# Patient Record
Sex: FEMALE | Race: WHITE | Employment: PART TIME | URBAN - NONMETROPOLITAN AREA
[De-identification: names, ages, dates, MRNs, and addresses within clinical notes are randomized per-mention and may not be internally consistent; named-entity substitution may affect disease eponyms.]

---

## 2017-02-28 PROBLEM — S42.017A CLOSED NONDISPLACED FRACTURE OF STERNAL END OF RIGHT CLAVICLE: Status: ACTIVE | Noted: 2017-02-28

## 2018-05-08 ENCOUNTER — OFFICE VISIT (OUTPATIENT)
Dept: ORTHOPEDIC SURGERY | Age: 38
End: 2018-05-08
Payer: COMMERCIAL

## 2018-05-08 VITALS
HEIGHT: 62 IN | HEART RATE: 102 BPM | BODY MASS INDEX: 36.8 KG/M2 | DIASTOLIC BLOOD PRESSURE: 83 MMHG | SYSTOLIC BLOOD PRESSURE: 117 MMHG | WEIGHT: 200 LBS

## 2018-05-08 DIAGNOSIS — S69.91XA HAND INJURIES, RIGHT, INITIAL ENCOUNTER: ICD-10-CM

## 2018-05-08 DIAGNOSIS — S62.339A CLOSED BOXER'S FRACTURE, INITIAL ENCOUNTER: Primary | ICD-10-CM

## 2018-05-08 PROCEDURE — 26605 TREAT METACARPAL FRACTURE: CPT | Performed by: ORTHOPAEDIC SURGERY

## 2018-05-08 PROCEDURE — 99214 OFFICE O/P EST MOD 30 MIN: CPT | Performed by: ORTHOPAEDIC SURGERY

## 2018-05-08 RX ORDER — IBUPROFEN 200 MG
200 TABLET ORAL EVERY 6 HOURS PRN
COMMUNITY
End: 2019-06-26

## 2018-05-08 RX ORDER — NAPROXEN 500 MG/1
TABLET ORAL
COMMUNITY
Start: 2018-05-03 | End: 2018-05-08 | Stop reason: ALTCHOICE

## 2018-05-08 RX ORDER — PREDNISONE 10 MG/1
TABLET ORAL
COMMUNITY
Start: 2018-03-08 | End: 2018-05-08 | Stop reason: ALTCHOICE

## 2018-05-08 RX ORDER — LISINOPRIL 10 MG/1
TABLET ORAL
Refills: 1 | COMMUNITY
Start: 2018-02-14 | End: 2019-11-07 | Stop reason: SDUPTHER

## 2018-06-08 ENCOUNTER — OFFICE VISIT (OUTPATIENT)
Dept: ORTHOPEDIC SURGERY | Age: 38
End: 2018-06-08

## 2018-06-08 VITALS
DIASTOLIC BLOOD PRESSURE: 80 MMHG | WEIGHT: 190 LBS | HEIGHT: 62 IN | HEART RATE: 85 BPM | BODY MASS INDEX: 34.96 KG/M2 | SYSTOLIC BLOOD PRESSURE: 129 MMHG

## 2018-06-08 DIAGNOSIS — S62.339A CLOSED BOXER'S FRACTURE, INITIAL ENCOUNTER: ICD-10-CM

## 2018-06-08 DIAGNOSIS — S69.91XA HAND INJURIES, RIGHT, INITIAL ENCOUNTER: Primary | ICD-10-CM

## 2018-06-08 PROCEDURE — 99024 POSTOP FOLLOW-UP VISIT: CPT | Performed by: ORTHOPAEDIC SURGERY

## 2019-05-14 ENCOUNTER — OFFICE VISIT (OUTPATIENT)
Dept: FAMILY MEDICINE CLINIC | Age: 39
End: 2019-05-14
Payer: COMMERCIAL

## 2019-05-14 VITALS
OXYGEN SATURATION: 99 % | BODY MASS INDEX: 39.14 KG/M2 | SYSTOLIC BLOOD PRESSURE: 126 MMHG | HEART RATE: 92 BPM | TEMPERATURE: 97.8 F | DIASTOLIC BLOOD PRESSURE: 78 MMHG | WEIGHT: 214 LBS

## 2019-05-14 DIAGNOSIS — L24.7 CONTACT DERMATITIS AND ECZEMA DUE TO PLANT: Primary | ICD-10-CM

## 2019-05-14 PROCEDURE — 99213 OFFICE O/P EST LOW 20 MIN: CPT | Performed by: FAMILY MEDICINE

## 2019-05-14 PROCEDURE — 96372 THER/PROPH/DIAG INJ SC/IM: CPT | Performed by: FAMILY MEDICINE

## 2019-05-14 RX ORDER — PREDNISONE 10 MG/1
10 TABLET ORAL 2 TIMES DAILY
Qty: 10 TABLET | Refills: 0 | Status: SHIPPED | OUTPATIENT
Start: 2019-05-14 | End: 2019-05-19

## 2019-05-14 RX ORDER — FLUTICASONE PROPIONATE 50 MCG
SPRAY, SUSPENSION (ML) NASAL
COMMUNITY
Start: 2019-01-29 | End: 2020-02-26

## 2019-05-14 RX ORDER — BUSPIRONE HYDROCHLORIDE 10 MG/1
TABLET ORAL
COMMUNITY
Start: 2018-02-14 | End: 2019-11-07 | Stop reason: SDUPTHER

## 2019-05-14 RX ORDER — METHYLPREDNISOLONE ACETATE 40 MG/ML
40 INJECTION, SUSPENSION INTRA-ARTICULAR; INTRALESIONAL; INTRAMUSCULAR; SOFT TISSUE ONCE
Status: COMPLETED | OUTPATIENT
Start: 2019-05-14 | End: 2019-05-14

## 2019-05-14 RX ADMIN — METHYLPREDNISOLONE ACETATE 40 MG: 40 INJECTION, SUSPENSION INTRA-ARTICULAR; INTRALESIONAL; INTRAMUSCULAR; SOFT TISSUE at 16:51

## 2019-05-14 NOTE — PROGRESS NOTES
(97.1 kg)   SpO2 99%   BMI 39.14 kg/m²     EXAM:   Physical Exam   Constitutional: She appears well-developed and well-nourished. HENT:   Head: Normocephalic and atraumatic. Eyes: Pupils are equal, round, and reactive to light. EOM are normal.   Neck: Normal range of motion. Cardiovascular: Normal rate and regular rhythm. Pulmonary/Chest: Effort normal and breath sounds normal.   Skin: Skin is warm and dry. Rash noted. There is erythema. Rash as described above   Nursing note and vitals reviewed. Fozia was seen today for rash. Diagnoses and all orders for this visit:    Contact dermatitis and eczema due to plant  -     methylPREDNISolone acetate (DEPO-MEDROL) injection 40 mg  -     predniSONE (DELTASONE) 10 MG tablet; Take 1 tablet by mouth 2 times daily for 5 days    If not feeling better in 2 to 3 days needs to follow up      Seen by:   Kiah Stanton DO

## 2019-05-17 ENCOUNTER — TELEPHONE (OUTPATIENT)
Dept: FAMILY MEDICINE CLINIC | Age: 39
End: 2019-05-17

## 2019-05-17 DIAGNOSIS — E55.9 VITAMIN D DEFICIENCY: ICD-10-CM

## 2019-05-17 DIAGNOSIS — R73.01 IMPAIRED FASTING BLOOD SUGAR: ICD-10-CM

## 2019-05-17 DIAGNOSIS — E78.2 MIXED HYPERLIPIDEMIA: Primary | ICD-10-CM

## 2019-05-17 DIAGNOSIS — E07.9 THYROID DISEASE: ICD-10-CM

## 2019-11-07 ENCOUNTER — HOSPITAL ENCOUNTER (OUTPATIENT)
Age: 39
Discharge: HOME | End: 2019-11-09
Payer: COMMERCIAL

## 2019-11-07 ENCOUNTER — OFFICE VISIT (OUTPATIENT)
Dept: FAMILY MEDICINE CLINIC | Age: 39
End: 2019-11-07
Payer: COMMERCIAL

## 2019-11-07 VITALS
HEIGHT: 63 IN | BODY MASS INDEX: 39.42 KG/M2 | OXYGEN SATURATION: 98 % | TEMPERATURE: 98.6 F | HEART RATE: 90 BPM | WEIGHT: 222.5 LBS | SYSTOLIC BLOOD PRESSURE: 128 MMHG | DIASTOLIC BLOOD PRESSURE: 68 MMHG

## 2019-11-07 DIAGNOSIS — F41.9 ANXIETY: ICD-10-CM

## 2019-11-07 DIAGNOSIS — E07.9 THYROID DISEASE: ICD-10-CM

## 2019-11-07 DIAGNOSIS — E55.9 VITAMIN D DEFICIENCY: ICD-10-CM

## 2019-11-07 DIAGNOSIS — Z00.00 ENCOUNTER FOR WELL ADULT EXAM WITHOUT ABNORMAL FINDINGS: Primary | ICD-10-CM

## 2019-11-07 DIAGNOSIS — I10 ESSENTIAL HYPERTENSION: ICD-10-CM

## 2019-11-07 DIAGNOSIS — E78.2 MIXED HYPERLIPIDEMIA: ICD-10-CM

## 2019-11-07 DIAGNOSIS — R73.01 IMPAIRED FASTING BLOOD SUGAR: ICD-10-CM

## 2019-11-07 PROBLEM — S42.017A CLOSED NONDISPLACED FRACTURE OF STERNAL END OF RIGHT CLAVICLE: Status: RESOLVED | Noted: 2017-02-28 | Resolved: 2019-11-07

## 2019-11-07 LAB
ALBUMIN SERPL-MCNC: 3.7 G/DL (ref 3.5–5.2)
ALP BLD-CCNC: 65 U/L (ref 35–104)
ALT SERPL-CCNC: 12 U/L (ref 0–32)
ANION GAP SERPL CALCULATED.3IONS-SCNC: 12 MMOL/L (ref 7–16)
AST SERPL-CCNC: 15 U/L (ref 0–31)
BASOPHILS ABSOLUTE: 0.06 E9/L (ref 0–0.2)
BASOPHILS RELATIVE PERCENT: 0.7 % (ref 0–2)
BILIRUB SERPL-MCNC: 0.3 MG/DL (ref 0–1.2)
BUN BLDV-MCNC: 11 MG/DL (ref 6–20)
CALCIUM SERPL-MCNC: 8.9 MG/DL (ref 8.6–10.2)
CHLORIDE BLD-SCNC: 102 MMOL/L (ref 98–107)
CHOLESTEROL, TOTAL: 172 MG/DL (ref 0–199)
CO2: 23 MMOL/L (ref 22–29)
CREAT SERPL-MCNC: 0.8 MG/DL (ref 0.5–1)
EOSINOPHILS ABSOLUTE: 0.23 E9/L (ref 0.05–0.5)
EOSINOPHILS RELATIVE PERCENT: 2.5 % (ref 0–6)
GFR AFRICAN AMERICAN: >60
GFR NON-AFRICAN AMERICAN: >60 ML/MIN/1.73
GLUCOSE BLD-MCNC: 92 MG/DL (ref 74–99)
HBA1C MFR BLD: 5.4 % (ref 4–5.6)
HCT VFR BLD CALC: 43.1 % (ref 34–48)
HDLC SERPL-MCNC: 60 MG/DL
HEMOGLOBIN: 13.5 G/DL (ref 11.5–15.5)
IMMATURE GRANULOCYTES #: 0.02 E9/L
IMMATURE GRANULOCYTES %: 0.2 % (ref 0–5)
LDL CHOLESTEROL CALCULATED: 81 MG/DL (ref 0–99)
LYMPHOCYTES ABSOLUTE: 3.77 E9/L (ref 1.5–4)
LYMPHOCYTES RELATIVE PERCENT: 41.3 % (ref 20–42)
MCH RBC QN AUTO: 29.7 PG (ref 26–35)
MCHC RBC AUTO-ENTMCNC: 31.3 % (ref 32–34.5)
MCV RBC AUTO: 94.7 FL (ref 80–99.9)
MONOCYTES ABSOLUTE: 0.53 E9/L (ref 0.1–0.95)
MONOCYTES RELATIVE PERCENT: 5.8 % (ref 2–12)
NEUTROPHILS ABSOLUTE: 4.51 E9/L (ref 1.8–7.3)
NEUTROPHILS RELATIVE PERCENT: 49.5 % (ref 43–80)
PDW BLD-RTO: 13 FL (ref 11.5–15)
PLATELET # BLD: 284 E9/L (ref 130–450)
PMV BLD AUTO: 9.8 FL (ref 7–12)
POTASSIUM SERPL-SCNC: 4.2 MMOL/L (ref 3.5–5)
RBC # BLD: 4.55 E12/L (ref 3.5–5.5)
SODIUM BLD-SCNC: 137 MMOL/L (ref 132–146)
TOTAL PROTEIN: 7.6 G/DL (ref 6.4–8.3)
TRIGL SERPL-MCNC: 153 MG/DL (ref 0–149)
TSH SERPL DL<=0.05 MIU/L-ACNC: 2.54 UIU/ML (ref 0.27–4.2)
VITAMIN D 25-HYDROXY: 35 NG/ML (ref 30–100)
VLDLC SERPL CALC-MCNC: 31 MG/DL
WBC # BLD: 9.1 E9/L (ref 4.5–11.5)

## 2019-11-07 PROCEDURE — 85025 COMPLETE CBC W/AUTO DIFF WBC: CPT

## 2019-11-07 PROCEDURE — 82306 VITAMIN D 25 HYDROXY: CPT

## 2019-11-07 PROCEDURE — 84443 ASSAY THYROID STIM HORMONE: CPT

## 2019-11-07 PROCEDURE — 99395 PREV VISIT EST AGE 18-39: CPT | Performed by: FAMILY MEDICINE

## 2019-11-07 PROCEDURE — 80053 COMPREHEN METABOLIC PANEL: CPT

## 2019-11-07 PROCEDURE — 36415 COLL VENOUS BLD VENIPUNCTURE: CPT

## 2019-11-07 PROCEDURE — 80061 LIPID PANEL: CPT

## 2019-11-07 PROCEDURE — 83036 HEMOGLOBIN GLYCOSYLATED A1C: CPT

## 2019-11-07 PROCEDURE — G8484 FLU IMMUNIZE NO ADMIN: HCPCS | Performed by: FAMILY MEDICINE

## 2019-11-07 RX ORDER — NORGESTIMATE AND ETHINYL ESTRADIOL 0.25-0.035
KIT ORAL
Qty: 84 TABLET | Refills: 1 | Status: SHIPPED | OUTPATIENT
Start: 2019-11-07 | End: 2020-02-03 | Stop reason: SDUPTHER

## 2019-11-07 RX ORDER — LISINOPRIL 10 MG/1
TABLET ORAL
Qty: 90 TABLET | Refills: 1 | Status: SHIPPED
Start: 2019-11-07 | End: 2020-02-26 | Stop reason: SINTOL

## 2019-11-07 RX ORDER — BUSPIRONE HYDROCHLORIDE 10 MG/1
10 TABLET ORAL 3 TIMES DAILY PRN
Qty: 90 TABLET | Refills: 3 | Status: SHIPPED
Start: 2019-11-07 | End: 2020-02-26

## 2019-11-07 ASSESSMENT — PATIENT HEALTH QUESTIONNAIRE - PHQ9
1. LITTLE INTEREST OR PLEASURE IN DOING THINGS: 1
2. FEELING DOWN, DEPRESSED OR HOPELESS: 1
SUM OF ALL RESPONSES TO PHQ QUESTIONS 1-9: 2
SUM OF ALL RESPONSES TO PHQ9 QUESTIONS 1 & 2: 2
SUM OF ALL RESPONSES TO PHQ QUESTIONS 1-9: 2

## 2019-11-07 ASSESSMENT — ENCOUNTER SYMPTOMS
BACK PAIN: 0
COUGH: 0
DIARRHEA: 0
VOMITING: 0
ABDOMINAL PAIN: 0
WHEEZING: 0
EYE PAIN: 0
CONSTIPATION: 0
SINUS PAIN: 0
NAUSEA: 0
SORE THROAT: 0
SHORTNESS OF BREATH: 0
CHEST TIGHTNESS: 0
TROUBLE SWALLOWING: 0

## 2019-11-24 ENCOUNTER — HOSPITAL ENCOUNTER (EMERGENCY)
Age: 39
Discharge: HOME | End: 2019-11-24
Payer: COMMERCIAL

## 2019-11-24 VITALS
RESPIRATION RATE: 16 BRPM | HEART RATE: 87 BPM | TEMPERATURE: 98 F | SYSTOLIC BLOOD PRESSURE: 128 MMHG | HEIGHT: 62 IN | BODY MASS INDEX: 39.56 KG/M2 | DIASTOLIC BLOOD PRESSURE: 89 MMHG | OXYGEN SATURATION: 97 % | WEIGHT: 215 LBS

## 2019-11-24 DIAGNOSIS — S05.02XA ABRASION OF LEFT CORNEA, INITIAL ENCOUNTER: Primary | ICD-10-CM

## 2019-11-24 PROCEDURE — 99282 EMERGENCY DEPT VISIT SF MDM: CPT

## 2019-11-24 PROCEDURE — 6370000000 HC RX 637 (ALT 250 FOR IP): Performed by: PHYSICIAN ASSISTANT

## 2019-11-24 RX ORDER — ERYTHROMYCIN 5 MG/G
OINTMENT OPHTHALMIC
Qty: 1 TUBE | Refills: 0 | Status: SHIPPED | OUTPATIENT
Start: 2019-11-24 | End: 2019-12-16

## 2019-11-24 RX ORDER — TETRACAINE HYDROCHLORIDE 5 MG/ML
2 SOLUTION OPHTHALMIC ONCE
Status: COMPLETED | OUTPATIENT
Start: 2019-11-24 | End: 2019-11-24

## 2019-11-24 RX ADMIN — TETRACAINE HYDROCHLORIDE 2 DROP: 5 SOLUTION OPHTHALMIC at 17:08

## 2019-11-24 RX ADMIN — FLUORESCEIN SODIUM 1 MG: 1 STRIP OPHTHALMIC at 17:08

## 2019-12-16 ENCOUNTER — OFFICE VISIT (OUTPATIENT)
Dept: FAMILY MEDICINE CLINIC | Age: 39
End: 2019-12-16
Payer: COMMERCIAL

## 2019-12-16 VITALS
HEART RATE: 96 BPM | OXYGEN SATURATION: 96 % | SYSTOLIC BLOOD PRESSURE: 108 MMHG | TEMPERATURE: 98.7 F | HEIGHT: 63 IN | BODY MASS INDEX: 39.87 KG/M2 | WEIGHT: 225 LBS | DIASTOLIC BLOOD PRESSURE: 62 MMHG

## 2019-12-16 DIAGNOSIS — J01.90 ACUTE NON-RECURRENT SINUSITIS, UNSPECIFIED LOCATION: Primary | ICD-10-CM

## 2019-12-16 PROCEDURE — G8484 FLU IMMUNIZE NO ADMIN: HCPCS | Performed by: FAMILY MEDICINE

## 2019-12-16 PROCEDURE — G8427 DOCREV CUR MEDS BY ELIG CLIN: HCPCS | Performed by: FAMILY MEDICINE

## 2019-12-16 PROCEDURE — G8417 CALC BMI ABV UP PARAM F/U: HCPCS | Performed by: FAMILY MEDICINE

## 2019-12-16 PROCEDURE — 99213 OFFICE O/P EST LOW 20 MIN: CPT | Performed by: FAMILY MEDICINE

## 2019-12-16 PROCEDURE — 1036F TOBACCO NON-USER: CPT | Performed by: FAMILY MEDICINE

## 2019-12-16 RX ORDER — BENZONATATE 200 MG/1
200 CAPSULE ORAL 3 TIMES DAILY PRN
Qty: 30 CAPSULE | Refills: 0 | Status: SHIPPED | OUTPATIENT
Start: 2019-12-16 | End: 2019-12-23

## 2019-12-16 RX ORDER — PREDNISONE 10 MG/1
10 TABLET ORAL 2 TIMES DAILY
Qty: 10 TABLET | Refills: 0 | Status: SHIPPED | OUTPATIENT
Start: 2019-12-16 | End: 2019-12-21

## 2019-12-16 RX ORDER — CEFDINIR 300 MG/1
300 CAPSULE ORAL 2 TIMES DAILY
Qty: 20 CAPSULE | Refills: 0 | Status: SHIPPED | OUTPATIENT
Start: 2019-12-16 | End: 2019-12-26

## 2019-12-16 ASSESSMENT — ENCOUNTER SYMPTOMS
EYE PAIN: 0
SINUS PRESSURE: 1
SHORTNESS OF BREATH: 0
CHEST TIGHTNESS: 0
NAUSEA: 0
WHEEZING: 0
COUGH: 1
SORE THROAT: 1
CONSTIPATION: 0
DIARRHEA: 0
VOMITING: 0
ABDOMINAL PAIN: 0
BACK PAIN: 0
SINUS PAIN: 0
TROUBLE SWALLOWING: 1

## 2020-01-23 ENCOUNTER — OFFICE VISIT (OUTPATIENT)
Dept: FAMILY MEDICINE CLINIC | Age: 40
End: 2020-01-23
Payer: COMMERCIAL

## 2020-01-23 VITALS
BODY MASS INDEX: 39.86 KG/M2 | SYSTOLIC BLOOD PRESSURE: 124 MMHG | HEART RATE: 92 BPM | DIASTOLIC BLOOD PRESSURE: 84 MMHG | HEIGHT: 63 IN | OXYGEN SATURATION: 97 % | RESPIRATION RATE: 18 BRPM | TEMPERATURE: 98.7 F

## 2020-01-23 PROCEDURE — 1036F TOBACCO NON-USER: CPT | Performed by: INTERNAL MEDICINE

## 2020-01-23 PROCEDURE — G8417 CALC BMI ABV UP PARAM F/U: HCPCS | Performed by: INTERNAL MEDICINE

## 2020-01-23 PROCEDURE — G8427 DOCREV CUR MEDS BY ELIG CLIN: HCPCS | Performed by: INTERNAL MEDICINE

## 2020-01-23 PROCEDURE — 99214 OFFICE O/P EST MOD 30 MIN: CPT | Performed by: INTERNAL MEDICINE

## 2020-01-23 PROCEDURE — G8484 FLU IMMUNIZE NO ADMIN: HCPCS | Performed by: INTERNAL MEDICINE

## 2020-01-23 RX ORDER — MOMETASONE FUROATE 50 UG/1
2 SPRAY, METERED NASAL DAILY
Qty: 1 INHALER | Refills: 3 | Status: SHIPPED
Start: 2020-01-23 | End: 2020-02-26

## 2020-01-23 RX ORDER — METHYLPREDNISOLONE 4 MG/1
TABLET ORAL
Qty: 1 KIT | Refills: 0 | Status: SHIPPED | OUTPATIENT
Start: 2020-01-23 | End: 2020-01-29

## 2020-01-23 RX ORDER — SULFAMETHOXAZOLE AND TRIMETHOPRIM 800; 160 MG/1; MG/1
1 TABLET ORAL 2 TIMES DAILY
Qty: 10 TABLET | Refills: 0 | Status: SHIPPED | OUTPATIENT
Start: 2020-01-23 | End: 2020-01-28

## 2020-01-23 NOTE — PROGRESS NOTES
2020   [facility]    [address] (:  1980) is a 42 y.o. female, presents to [facility] care with symptoms for 3 days. When she coughs is nonproductive. Her sinus drainage is green in color. She has facial pain but no headaches. No vision changes. No fevers or chills. Chief Complaint   Patient presents with    Cough     Pt states she was in [facility] on  with same symptoms continuing.  Sinus Problem    Otalgia         Review of Systems    Prior to Visit Medications    Medication Sig Taking? Authorizing Provider   norgestimate-ethinyl estradiol (SPRINTEC 28) 0.25-35 MG-MCG per tablet TAKE 1 TABLET DAILY Yes Branden Recinos DO   sertraline (ZOLOFT) 50 MG tablet TAKE 1 1/2 TABLET BY MOUTH EVERY DAY Yes Deja Arevalo, DO   lisinopril (PRINIVIL;ZESTRIL) 10 MG tablet TAKE 1 TABLET BY MOUTH EVERY DAY FOR BLOOD PRESSURE Yes Deo Arevalo,    fluticasone (FLONASE) 50 MCG/ACT nasal spray  Yes Historical Provider, MD   busPIRone (BUSPAR) 10 MG tablet Take 1 tablet by mouth 3 times daily as needed (anxiety)  Patient not taking: Reported on 2020  Branden Recinos DO      Allergies   Allergen Reactions    Penicillin G     Penicillins Nausea Only       Past Medical History:   Diagnosis Date    Anxiety     Depression     Essential hypertension 2019    Fractures 2017    Rt.  clavicle fracture    High blood pressure      Past Surgical History:   Procedure Laterality Date    BREAST REDUCTION SURGERY  2014    CARPAL TUNNEL RELEASE Bilateral      SECTION      WISDOM TOOTH EXTRACTION        Social History     Tobacco Use    Smoking status: Former Smoker     Packs/day: 0.50     Types: Cigarettes     Last attempt to quit: 2014     Years since quittin.4    Smokeless tobacco: Never Used   Substance Use Topics    Alcohol use: No        Vitals:    20 1146   BP: 124/84   Pulse: 92   Resp: 18   Temp: 98.7 °F (37.1 °C)   TempSrc: Temporal SpO2: 97%   Height: 5' 3\" (1.6 m)     Estimated body mass index is 39.86 kg/m² as calculated from the following:    Height as of this encounter: 5' 3\" (1.6 m). Weight as of 12/16/19: 225 lb (102.1 kg). Physical Exam  Constitutional:       Appearance: She is obese. She is ill-appearing. She is not toxic-appearing. HENT:      Head: Normocephalic. Nose:      Comments: Mucosa is erythemic bilaterally with some edema on the left. She has left maxillary edema on palpation. It is however the right TM that is injected. Left TM is otherwise clear. No cervical lymphadenopathy. Mouth/Throat:      Mouth: Mucous membranes are moist.   Eyes:      Conjunctiva/sclera: Conjunctivae normal.      Pupils: Pupils are equal, round, and reactive to light. Cardiovascular:      Rate and Rhythm: Normal rate and regular rhythm. Pulses: Normal pulses. Pulmonary:      Effort: Pulmonary effort is normal.      Breath sounds: No wheezing, rhonchi or rales. Lymphadenopathy:      Cervical: No cervical adenopathy. Neurological:      Mental Status: She is alert. ASSESSMENT/PLAN:  No diagnosis found. 1 to treat her for a maxillary sinusitis. Also she has the right otitis media. Bactrim DS 1 p.o. twice daily for 2 weeks. She will follow-up with Dr. Kristen Baires in 2 weeks but have a sinus x-ray performed before she sees her. No nasal decongestants as her sinus mucosa is already dry. She is going to try Nasonex which is a glycerin base instead of the Flonase which is alcohol based as an allergy therapy. No follow-ups on file. An electronic signature was used to authenticate this note.     --Tiffany Campo,  on 1/23/2020 at 12:10 PM

## 2020-02-03 RX ORDER — NORGESTIMATE AND ETHINYL ESTRADIOL 0.25-0.035
KIT ORAL
Qty: 28 TABLET | Refills: 0 | Status: SHIPPED
Start: 2020-02-03 | End: 2020-04-21 | Stop reason: SDUPTHER

## 2020-02-07 ENCOUNTER — TELEPHONE (OUTPATIENT)
Dept: FAMILY MEDICINE CLINIC | Age: 40
End: 2020-02-07

## 2020-02-26 ENCOUNTER — OFFICE VISIT (OUTPATIENT)
Dept: FAMILY MEDICINE CLINIC | Age: 40
End: 2020-02-26
Payer: COMMERCIAL

## 2020-02-26 VITALS
HEIGHT: 63 IN | BODY MASS INDEX: 40.79 KG/M2 | TEMPERATURE: 98.3 F | HEART RATE: 90 BPM | DIASTOLIC BLOOD PRESSURE: 78 MMHG | SYSTOLIC BLOOD PRESSURE: 120 MMHG | OXYGEN SATURATION: 98 % | WEIGHT: 230.2 LBS

## 2020-02-26 PROCEDURE — G8484 FLU IMMUNIZE NO ADMIN: HCPCS | Performed by: FAMILY MEDICINE

## 2020-02-26 PROCEDURE — G8427 DOCREV CUR MEDS BY ELIG CLIN: HCPCS | Performed by: FAMILY MEDICINE

## 2020-02-26 PROCEDURE — G8417 CALC BMI ABV UP PARAM F/U: HCPCS | Performed by: FAMILY MEDICINE

## 2020-02-26 PROCEDURE — 99214 OFFICE O/P EST MOD 30 MIN: CPT | Performed by: FAMILY MEDICINE

## 2020-02-26 PROCEDURE — 1036F TOBACCO NON-USER: CPT | Performed by: FAMILY MEDICINE

## 2020-02-26 RX ORDER — LORATADINE 10 MG/1
10 TABLET ORAL EVERY OTHER DAY
COMMUNITY

## 2020-02-26 RX ORDER — AZELASTINE 1 MG/ML
2 SPRAY, METERED NASAL 2 TIMES DAILY
Qty: 1 BOTTLE | Refills: 5 | Status: SHIPPED
Start: 2020-02-26 | End: 2021-01-18

## 2020-02-26 RX ORDER — LOSARTAN POTASSIUM 25 MG/1
25 TABLET ORAL DAILY
Qty: 90 TABLET | Refills: 1 | Status: SHIPPED
Start: 2020-02-26 | End: 2020-08-24 | Stop reason: SDUPTHER

## 2020-02-26 ASSESSMENT — PATIENT HEALTH QUESTIONNAIRE - PHQ9
SUM OF ALL RESPONSES TO PHQ9 QUESTIONS 1 & 2: 0
1. LITTLE INTEREST OR PLEASURE IN DOING THINGS: 0
SUM OF ALL RESPONSES TO PHQ QUESTIONS 1-9: 0
2. FEELING DOWN, DEPRESSED OR HOPELESS: 0
SUM OF ALL RESPONSES TO PHQ QUESTIONS 1-9: 0

## 2020-02-26 ASSESSMENT — ENCOUNTER SYMPTOMS
SORE THROAT: 0
SINUS PRESSURE: 1
ABDOMINAL PAIN: 0
VOMITING: 0
COUGH: 1
NAUSEA: 0
CHEST TIGHTNESS: 0
SINUS PAIN: 0
DIARRHEA: 0
WHEEZING: 0
EYE PAIN: 0
CONSTIPATION: 0
BACK PAIN: 0
TROUBLE SWALLOWING: 0
SHORTNESS OF BREATH: 0

## 2020-02-26 NOTE — PROGRESS NOTES
2/26/20    Name: Tanna Kirk  RGE:[id number]   Sex:female   Age:40 y.o. Chief Complaint   Patient presents with    Sinusitis     Patient is here for [facility] up from [facility] for sinusitis. She says she continues to have sinus issues with congestion, post nasal drip, and cough. Patient says the Flonase and Nasonex do not work, so she doesn't take them. She takes Claritin every other day because it dries out her nasal cavities too much if she takes it daily. Doctor through [facility] ordered xray of sinuses, which she did right before her visit today. The cough is dry and hacking but may also be from the lisinopril  We will switch this and see if that helps  But still sinus congestion and drainage  Did  Not get the nasonex too exprensive  flonase no help  Takes claritin every other day and that helps    HTN stable  No changes  Readings good    aniety stable  Still has bad days but refuses more meds  She leads a latoya stressful life    Review of Systems   Constitutional: Negative for appetite change, fatigue and fever. HENT: Positive for congestion, postnasal drip and sinus pressure. Negative for ear pain, sinus pain, sore throat and trouble swallowing. Eyes: Negative for pain. Respiratory: Positive for cough. Negative for chest tightness, shortness of breath and wheezing. Cardiovascular: Negative for chest pain, palpitations and leg swelling. Gastrointestinal: Negative for abdominal pain, constipation, diarrhea, nausea and vomiting. Endocrine: Negative for cold intolerance and heat intolerance. Genitourinary: Negative for difficulty urinating, dysuria, frequency, hematuria, pelvic pain and urgency. Musculoskeletal: Negative for arthralgias, back pain, gait problem, joint swelling and myalgias. Skin: Negative for rash and wound. Neurological: Negative for dizziness, syncope, light-headedness and headaches. Hematological: Negative for adenopathy.    Psychiatric/Behavioral: Negative for confusion, dysphoric mood, self-injury, sleep disturbance and suicidal ideas. The patient is not nervous/anxious. Current Outpatient Medications:     loratadine (CLARITIN) 10 MG tablet, Take 10 mg by mouth every other day, Disp: , Rfl:     azelastine (ASTELIN) 0.1 % nasal spray, 2 sprays by Nasal route 2 times daily Use in each nostril as directed, Disp: 1 Bottle, Rfl: 5    losartan (COZAAR) 25 MG tablet, Take 1 tablet by mouth daily, Disp: 90 tablet, Rfl: 1    norgestimate-ethinyl estradiol (SPRINTEC 28) 0.25-35 MG-MCG per tablet, TAKE 1 TABLET DAILY, Disp: 28 tablet, Rfl: 0    sertraline (ZOLOFT) 50 MG tablet, TAKE 1 1/2 TABLET BY MOUTH EVERY DAY, Disp: 135 tablet, Rfl: 1  Allergies   Allergen Reactions    Penicillins Nausea Only      Past Medical History:   Diagnosis Date    Anxiety     Depression     Essential hypertension 2019    Fractures 2017    Rt. clavicle fracture    High blood pressure      Patient Active Problem List    Diagnosis Date Noted    Anxiety 2019    Essential hypertension 2019      Past Surgical History:   Procedure Laterality Date    BREAST REDUCTION SURGERY      CARPAL TUNNEL RELEASE Bilateral      SECTION      WISDOM TOOTH EXTRACTION        Social History     Tobacco History     Smoking Status  Former Smoker Quit date  2014 Smoking Frequency  0.5 packs/day Smoking Tobacco Type  Cigarettes    Smokeless Tobacco Use  Never Used          Alcohol History     Alcohol Use Status  No          Drug Use     Drug Use Status  No          Sexual Activity     Sexually Active  Yes Partners  Male Comment              /78   Pulse 90   Temp 98.3 °F (36.8 °C)   Ht 5' 3\" (1.6 m)   Wt 230 lb 3.2 oz (104.4 kg)   SpO2 98%   BMI 40.78 kg/m²     EXAM:   Physical Exam  Vitals signs and nursing note reviewed. Constitutional:       Appearance: She is well-developed. She is obese. HENT:      Head: Normocephalic and atraumatic. Right Ear: Tympanic membrane normal.      Left Ear: Tympanic membrane normal.      Nose: Nose normal.      Mouth/Throat:      Mouth: Mucous membranes are moist.   Eyes:      Pupils: Pupils are equal, round, and reactive to light. Neck:      Musculoskeletal: Normal range of motion. Cardiovascular:      Rate and Rhythm: Normal rate and regular rhythm. Pulmonary:      Effort: Pulmonary effort is normal.      Breath sounds: Normal breath sounds. Abdominal:      General: Bowel sounds are normal.      Palpations: Abdomen is soft. Skin:     General: Skin is warm and dry. Neurological:      General: No focal deficit present. Mental Status: She is alert and oriented to person, place, and time. Psychiatric:         Mood and Affect: Mood normal.         Thought Content: Thought content normal.          Fozia was seen today for sinusitis. Diagnoses and all orders for this visit:    Chronic sinus complaints  -     Lulu Colón MD, Otolaryngology [place] ([facility])    Anxiety  Comments:  stable  no chnages    Essential hypertension  Comments:  stable  no changes  no sudafed due to this    Other orders  -     azelastine (ASTELIN) 0.1 % nasal spray; 2 sprays by Nasal route 2 times daily Use in each nostril as directed  -     losartan (COZAAR) 25 MG tablet; Take 1 tablet by mouth daily    season sinus problems  Continue claritin every other ay  astelin nasal spray  Humidifier'  Refer to ENT  F/u as needed      I independently reviewed and updated the chief complaint, HPI, past medical and surgical history, medications, allergies and ROS as entered by the LPN. Seen by:   Machelle Wallace DO

## 2020-04-17 ENCOUNTER — TELEPHONE (OUTPATIENT)
Dept: FAMILY MEDICINE CLINIC | Age: 40
End: 2020-04-17

## 2020-04-21 ENCOUNTER — OFFICE VISIT (OUTPATIENT)
Dept: PRIMARY CARE CLINIC | Age: 40
End: 2020-04-21
Payer: COMMERCIAL

## 2020-04-21 VITALS
HEIGHT: 63 IN | TEMPERATURE: 97.4 F | DIASTOLIC BLOOD PRESSURE: 82 MMHG | HEART RATE: 108 BPM | BODY MASS INDEX: 39.73 KG/M2 | SYSTOLIC BLOOD PRESSURE: 138 MMHG | OXYGEN SATURATION: 99 % | WEIGHT: 224.2 LBS

## 2020-04-21 PROCEDURE — G8417 CALC BMI ABV UP PARAM F/U: HCPCS | Performed by: FAMILY MEDICINE

## 2020-04-21 PROCEDURE — 99214 OFFICE O/P EST MOD 30 MIN: CPT | Performed by: FAMILY MEDICINE

## 2020-04-21 PROCEDURE — 1036F TOBACCO NON-USER: CPT | Performed by: FAMILY MEDICINE

## 2020-04-21 PROCEDURE — G8427 DOCREV CUR MEDS BY ELIG CLIN: HCPCS | Performed by: FAMILY MEDICINE

## 2020-04-21 RX ORDER — NORGESTIMATE AND ETHINYL ESTRADIOL 0.25-0.035
KIT ORAL
Qty: 84 TABLET | Refills: 0 | Status: SHIPPED
Start: 2020-04-21 | End: 2020-08-24

## 2020-04-21 ASSESSMENT — ENCOUNTER SYMPTOMS
BACK PAIN: 0
SORE THROAT: 0
DIARRHEA: 0
WHEEZING: 0
CHEST TIGHTNESS: 0
CONSTIPATION: 0
SINUS PAIN: 0
COUGH: 1
TROUBLE SWALLOWING: 0
ABDOMINAL PAIN: 0
VOMITING: 0
SHORTNESS OF BREATH: 0
EYE PAIN: 0
NAUSEA: 0

## 2020-04-21 NOTE — PROGRESS NOTES
4/21/20    Name: Frieda Guallpa  UOO:[birth year]   Sex:female   Age:40 y.o. Chief Complaint   Patient presents with    Hypertension    Stress     Patient presents to office for follow up on hypertension and anxiety. Patient had stopped losartan because she continued with a chronic cough, she continued to monitor blood pressure and her readings stayed similar to today's reading in the office. Patient says she gets extreme anxiety about a week before her menses which shot her blood pressure up. She started taking the Losartan again about a week ago, she says the cough has returned. Patient is asking for PCP to fill her birth control again, she says she can't get in with her gyn. Her blood pressures are doing better  Taking the losartan  Cough is likely from allergies  Not productive  But allergies have been acting up in the last few weeks    Stress has been a little elevated as well but now that her period is over it is better        Review of Systems   Constitutional: Negative for appetite change, fatigue and fever. HENT: Negative for congestion, ear pain, sinus pain, sore throat and trouble swallowing. Eyes: Negative for pain. Respiratory: Positive for cough. Negative for chest tightness, shortness of breath and wheezing. Cardiovascular: Negative for chest pain, palpitations and leg swelling. Gastrointestinal: Negative for abdominal pain, constipation, diarrhea, nausea and vomiting. Endocrine: Negative for cold intolerance and heat intolerance. Genitourinary: Negative for difficulty urinating, dysuria, frequency, hematuria, pelvic pain and urgency. Musculoskeletal: Negative for arthralgias, back pain, gait problem, joint swelling and myalgias. Skin: Negative for rash and wound. Neurological: Negative for dizziness, syncope, light-headedness and headaches. Hematological: Negative for adenopathy.    Psychiatric/Behavioral: Negative for confusion, dysphoric mood, self-injury, sleep Tympanic membrane normal.      Left Ear: Tympanic membrane normal.      Nose: Nose normal.      Mouth/Throat:      Mouth: Mucous membranes are moist.   Eyes:      Pupils: Pupils are equal, round, and reactive to light. Neck:      Musculoskeletal: Normal range of motion. Cardiovascular:      Rate and Rhythm: Normal rate and regular rhythm. Pulmonary:      Effort: Pulmonary effort is normal.      Breath sounds: Normal breath sounds. Abdominal:      General: Bowel sounds are normal.      Palpations: Abdomen is soft. Skin:     General: Skin is warm and dry. Neurological:      General: No focal deficit present. Mental Status: She is alert and oriented to person, place, and time. Psychiatric:         Mood and Affect: Mood normal.         Thought Content: Thought content normal.          Fozia was seen today for hypertension and stress. Diagnoses and all orders for this visit:    Essential hypertension  Comments:  stable  continue meds-losartan at 25mg daily      Anxiety  Comments:  stable  continue sertraline  Orders:  -     sertraline (ZOLOFT) 50 MG tablet; TAKE 1 TABLET BY MOUTH EVERY DAY    Encounter for surveillance of contraceptive pills  Comments:  bcp refilled but needs to see DR Elisa Prader for pap    Seasonal allergic rhinitis due to other allergic trigger  Comments:  resume allergy meds  no changes hjust needs to be taking them daily      Other orders  -     norgestimate-ethinyl estradiol ([id number]) 0.25-35 MG-MCG per tablet; TAKE 1 TABLET DAILY    appt in 3 months to recheck blood pressure  Sooner if her cough worsens    I independently reviewed and updated the chief complaint, HPI, past medical and surgical history, medications, allergies and ROS as entered by the LPN. Seen by:   Rosaura Chávez DO

## 2020-05-19 RX ORDER — NORGESTIMATE AND ETHINYL ESTRADIOL 0.25-0.035
KIT ORAL
Qty: 84 TABLET | Refills: 0 | OUTPATIENT
Start: 2020-05-19

## 2020-08-20 ENCOUNTER — OFFICE VISIT (OUTPATIENT)
Dept: PRIMARY CARE CLINIC | Age: 40
End: 2020-08-20
Payer: COMMERCIAL

## 2020-08-20 ENCOUNTER — HOSPITAL ENCOUNTER (OUTPATIENT)
Age: 40
Discharge: HOME | End: 2020-08-22
Payer: COMMERCIAL

## 2020-08-20 VITALS
SYSTOLIC BLOOD PRESSURE: 130 MMHG | RESPIRATION RATE: 18 BRPM | TEMPERATURE: 99.2 F | HEART RATE: 103 BPM | BODY MASS INDEX: 40.67 KG/M2 | WEIGHT: 221 LBS | OXYGEN SATURATION: 97 % | HEIGHT: 62 IN | DIASTOLIC BLOOD PRESSURE: 80 MMHG

## 2020-08-20 PROCEDURE — U0003 INFECTIOUS AGENT DETECTION BY NUCLEIC ACID (DNA OR RNA); SEVERE ACUTE RESPIRATORY SYNDROME CORONAVIRUS 2 (SARS-COV-2) (CORONAVIRUS DISEASE [COVID-19]), AMPLIFIED PROBE TECHNIQUE, MAKING USE OF HIGH THROUGHPUT TECHNOLOGIES AS DESCRIBED BY CMS-2020-01-R: HCPCS

## 2020-08-20 PROCEDURE — 99201 PR OFFICE OUTPATIENT NEW 10 MINUTES: CPT | Performed by: CLINICAL NURSE SPECIALIST

## 2020-08-20 RX ORDER — AZITHROMYCIN 250 MG/1
250 TABLET, FILM COATED ORAL SEE ADMIN INSTRUCTIONS
Qty: 6 TABLET | Refills: 0 | Status: SHIPPED | OUTPATIENT
Start: 2020-08-20 | End: 2020-08-25

## 2020-08-20 RX ORDER — ALBUTEROL SULFATE 90 UG/1
2 AEROSOL, METERED RESPIRATORY (INHALATION) EVERY 4 HOURS PRN
Qty: 1 INHALER | Refills: 0 | Status: SHIPPED | OUTPATIENT
Start: 2020-08-20 | End: 2021-07-13

## 2020-08-20 ASSESSMENT — ENCOUNTER SYMPTOMS
SORE THROAT: 1
APNEA: 0
CHEST TIGHTNESS: 0
GASTROINTESTINAL NEGATIVE: 1
ALLERGIC/IMMUNOLOGIC NEGATIVE: 1
SHORTNESS OF BREATH: 1
WHEEZING: 1
COUGH: 1
STRIDOR: 0
CHOKING: 0

## 2020-08-20 NOTE — PROGRESS NOTES
Subjective:      Patient ID: Lg Johns is a 47 y.o. female. Patient presents to [facility] complaining of cough, congestion, fatigue and body aches. 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 Cough is non productive and dry. States that her throat is itchy. Complains of being wheezy last night. Denies a fever and today is her  highest temp. Review of Systems   Constitutional: Positive for fatigue. HENT: Positive for congestion and sore throat. Respiratory: Positive for cough, shortness of breath and wheezing. Negative for apnea, choking, chest tightness and stridor. Cardiovascular: Negative. Gastrointestinal: Negative. Endocrine: Negative. Genitourinary: Negative. Musculoskeletal: Negative. Skin: Negative. Allergic/Immunologic: Negative. Neurological: Negative. Hematological: Negative. Psychiatric/Behavioral: Negative. Objective:   Physical Exam  Vitals signs and nursing note reviewed. Constitutional:       General: She is not in acute distress. Appearance: Normal appearance. She is normal weight. She is not ill-appearing, toxic-appearing or diaphoretic. HENT:      Head: Normocephalic. Right Ear: Tympanic membrane, ear canal and external ear normal. There is no impacted cerumen. Left Ear: Tympanic membrane, ear canal and external ear normal. There is no impacted cerumen. Nose: Nose normal. No congestion or rhinorrhea. Mouth/Throat:      Mouth: Mucous membranes are moist.      Pharynx: Oropharynx is clear. Posterior oropharyngeal erythema present. No oropharyngeal exudate. Eyes:      General: No scleral icterus. Right eye: No discharge. Left eye: No discharge. Extraocular Movements: Extraocular movements intact.       Conjunctiva/sclera: Conjunctivae normal.      Pupils: Pupils are equal, round, and reactive to light.   Neck:      Musculoskeletal: Normal range of motion and neck supple. No neck rigidity or muscular tenderness. Vascular: No carotid bruit. Cardiovascular:      Rate and Rhythm: Normal rate and regular rhythm. Pulses: Normal pulses. Heart sounds: Normal heart sounds. No murmur. No friction rub. No gallop. Pulmonary:      Effort: Pulmonary effort is normal. No respiratory distress. Breath sounds: Normal breath sounds. No stridor. No wheezing, rhonchi or rales. Chest:      Chest wall: No tenderness. Abdominal:      General: Abdomen is flat. Bowel sounds are normal. There is no distension. Palpations: Abdomen is soft. There is no mass. Tenderness: There is no abdominal tenderness. There is no right CVA tenderness, left CVA tenderness, guarding or rebound. Hernia: No hernia is present. Musculoskeletal: Normal range of motion. General: No swelling, tenderness, deformity or signs of injury. Right lower leg: No edema. Left lower leg: No edema. Lymphadenopathy:      Cervical: Cervical adenopathy present. Skin:     General: Skin is warm and dry. Capillary Refill: Capillary refill takes less than 2 seconds. Coloration: Skin is not jaundiced or pale. Findings: No bruising, erythema, lesion or rash. Neurological:      General: No focal deficit present. Mental Status: She is alert and oriented to person, place, and time. Cranial Nerves: No cranial nerve deficit. Sensory: No sensory deficit. Motor: No weakness. Coordination: Coordination normal.      Gait: Gait normal.      Deep Tendon Reflexes: Reflexes normal.   Psychiatric:         Mood and Affect: Mood normal.         Behavior: Behavior normal.         Thought Content:  Thought content normal.         Judgment: Judgment normal.       /80   Pulse 103   Temp 99.2 °F (37.3 °C) (Oral)   Resp 18   Ht 5' 2\" (1.575 m)   Wt 221 lb (100.2 kg)   SpO2 97%   BMI 40.42 kg/m²     Assessment:       Diagnosis Orders   1. Suspected COVID-19 virus infection  COVID-19 Ambulatory    azithromycin (ZITHROMAX) 250 MG tablet   2. Seasonal allergic rhinitis, unspecified trigger     3. Wheezing  albuterol sulfate  (90 Base) MCG/ACT inhaler   4. Bronchitis  azithromycin (ZITHROMAX) 250 MG tablet           Plan:      Reviewed medication and past medical history. Covid testing done. Albuterol and zpack escribed to pharmacy. Advised to remain off work and self isolate until covid testing results are available. Work slip given to patient. Follow up with PCP. Go to the ER if symptoms worsen.          Armen Turner, APRN - CNP

## 2020-08-20 NOTE — PROGRESS NOTES
Fozia Naidu  1980    Chief Complaint   Patient presents with    Cough    Congestion    Fatigue    Generalized Body Aches       Respiratory Symptoms:  Patient complains of 1 day(s) history of nasal congestion, non-productive cough, fatigue and body aches. Symptoms have been worsening with time. She denies any other symptoms. 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 Relevant PMH: No pertinent PMH. Smoking history:  She  reports that she quit smoking about 6 years ago. Her smoking use included cigarettes. She smoked 0.50 packs per day. She has never used smokeless tobacco.     She has had ill contacts with covid. Treatment to date: none. Travel screen completed:   Yes

## 2020-08-22 LAB
SARS-COV-2: DETECTED
SOURCE: ABNORMAL

## 2020-08-24 ENCOUNTER — VIRTUAL VISIT (OUTPATIENT)
Dept: FAMILY MEDICINE CLINIC | Age: 40
End: 2020-08-24
Payer: COMMERCIAL

## 2020-08-24 PROCEDURE — G8427 DOCREV CUR MEDS BY ELIG CLIN: HCPCS | Performed by: FAMILY MEDICINE

## 2020-08-24 PROCEDURE — 1036F TOBACCO NON-USER: CPT | Performed by: FAMILY MEDICINE

## 2020-08-24 PROCEDURE — 99214 OFFICE O/P EST MOD 30 MIN: CPT | Performed by: FAMILY MEDICINE

## 2020-08-24 PROCEDURE — G8417 CALC BMI ABV UP PARAM F/U: HCPCS | Performed by: FAMILY MEDICINE

## 2020-08-24 RX ORDER — LOSARTAN POTASSIUM 25 MG/1
25 TABLET ORAL DAILY
Qty: 90 TABLET | Refills: 1 | Status: SHIPPED
Start: 2020-08-24 | End: 2020-10-12

## 2020-08-24 ASSESSMENT — ENCOUNTER SYMPTOMS
VOMITING: 0
WHEEZING: 0
NAUSEA: 0
SORE THROAT: 0
ABDOMINAL PAIN: 0
SINUS PAIN: 0
CONSTIPATION: 0
BACK PAIN: 0
TROUBLE SWALLOWING: 0
CHEST TIGHTNESS: 0
DIARRHEA: 0
SHORTNESS OF BREATH: 0
EYE PAIN: 0
COUGH: 0

## 2020-08-24 NOTE — PROGRESS NOTES
8/24/20    Name: Yesi Quinonez  ABZ:[birth year]   Sex:female   Age:40 y.o. Chief Complaint   Patient presents with    Hypertension    Anxiety     Patient presents from home for video visit. She has not been checking her blood pressures. Patient recently tested positive for COVID-19, she is finishing Zithromax prescribed in flu clinic. Patient says she wasn't given any advice on what to watch out for, how long she is contagious, etc. Patient says her insurance will only cover medications if sent to mail order now. Patient did see gyn, gyn would not fill birth control due to patient's age, the fact that she use to smoke, quit in 2013, and has hypertension. Gyn wants patient to get IUD, patient's insurance does not cover IUD. TeleMedicine Video Visit    This visit was performed as a virtual video visit using a synchronous, two-way, audio-video telehealth technology platform. Patient identification was verified at the start of the visit, including the patient's telephone number and physical location. I discussed with the patient the nature of our telehealth visits, that:     Due to the nature of an audio- video modality, the only components of a physical exam that could be done are the elements supported by direct observation. I would evaluate the patient and recommend diagnostics and treatments based on my assessment. If it was felt that the patient should be evaluated in clinic or an emergency room setting, then they would be directed there. Our sessions are not being recorded and that personal health information is protected. Our team would provide follow up care in person if/when the patient needs it. Patient does agree to proceed with telemedicine consultation.     Patient's location: home address in [facility]  Physician  location other address in [facility] other people involved in call were Shan Wiseman and my     See below for progress note    Time spent: Greater than Not billed by time    This visit was completed virtually using Doxy. me    Patient here for video visit for blood pressure follow up  It has been running fine at home ith the losartan    She went and saw Dr Garzon Needs for pap and abnormal bleeding  He wanted her to do an IUD but her insurance will not cover it  He refused to prescribe BCP b/c she is 47, former smoker, she quit in 2013 and she is slightly higher risk for clot or PE          Review of Systems   Constitutional: Positive for fatigue. Negative for appetite change and fever. HENT: Negative for congestion, ear pain, sinus pain, sore throat and trouble swallowing. Eyes: Negative for pain. Respiratory: Negative for cough, chest tightness, shortness of breath and wheezing. Cardiovascular: Negative for chest pain, palpitations and leg swelling. Gastrointestinal: Negative for abdominal pain, constipation, diarrhea, nausea and vomiting. Endocrine: Negative for cold intolerance and heat intolerance. Genitourinary: Negative for difficulty urinating, dysuria, frequency, hematuria and pelvic pain. Musculoskeletal: Positive for myalgias. Negative for arthralgias, back pain, gait problem and joint swelling. Skin: Negative for rash and wound. Neurological: Positive for headaches. Negative for dizziness, syncope and light-headedness. Hematological: Negative for adenopathy. Psychiatric/Behavioral: Negative for confusion, dysphoric mood, self-injury, sleep disturbance and suicidal ideas. The patient is not nervous/anxious.             Current Outpatient Medications:     sertraline (ZOLOFT) 50 MG tablet, TAKE 1 TABLET BY MOUTH EVERY DAY, Disp: 90 tablet, Rfl: 1    losartan (COZAAR) 25 MG tablet, Take 1 tablet by mouth daily, Disp: 90 tablet, Rfl: 1    albuterol sulfate  (90 Base) MCG/ACT inhaler, Inhale 2 puffs into the lungs every 4 hours as needed for Wheezing, Disp: 1 Inhaler, Rfl: 0    azithromycin (ZITHROMAX) 250 MG tablet, Take 1 tablet by mouth See Admin Instructions physical due to video visit      Fozia was seen today for hypertension and anxiety. Diagnoses and all orders for this visit:    Essential hypertension  Comments:  stable  good control  no changes  continue meds  Orders:  -     losartan (COZAAR) 25 MG tablet; Take 1 tablet by mouth daily  -     CBC Auto Differential; Future  -     Comprehensive Metabolic Panel; Future    Anxiety  Comments:  stable  continue sertraline  Orders:  -     sertraline (ZOLOFT) 50 MG tablet; TAKE 1 TABLET BY MOUTH EVERY DAY    Mixed hyperlipidemia  -     Lipid Panel; Future    Thyroid disease  -     TSH without Reflex; Future    f/u in 6 months with labs      I independently reviewed and updated the chief complaint, HPI, past medical and surgical history, medications, allergies and ROS as entered by the LPN. Seen by:   Rakan Rojas DO

## 2020-10-12 RX ORDER — LOSARTAN POTASSIUM 25 MG/1
TABLET ORAL
Qty: 90 TABLET | Refills: 0 | Status: SHIPPED
Start: 2020-10-12 | End: 2021-01-14 | Stop reason: SDUPTHER

## 2020-11-25 LAB
SARS-COV-2: NOT DETECTED
SOURCE: NORMAL

## 2020-11-28 LAB
SARS-COV-2: NOT DETECTED
SOURCE: NORMAL

## 2020-11-29 LAB
SARS-COV-2: NOT DETECTED
SOURCE: NORMAL

## 2020-12-03 LAB
SARS-COV-2: NOT DETECTED
SOURCE: NORMAL

## 2020-12-06 LAB
SARS-COV-2: NOT DETECTED
SOURCE: NORMAL

## 2020-12-11 LAB
SARS-COV-2: NOT DETECTED
SOURCE: NORMAL

## 2020-12-13 LAB
SARS-COV-2: NOT DETECTED
SOURCE: NORMAL

## 2020-12-17 LAB
SARS-COV-2: NOT DETECTED
SOURCE: NORMAL

## 2020-12-20 LAB
SARS-COV-2: NOT DETECTED
SOURCE: NORMAL

## 2020-12-24 LAB
SARS-COV-2: NOT DETECTED
SOURCE: NORMAL

## 2020-12-27 LAB
SARS-COV-2: NOT DETECTED
SOURCE: NORMAL

## 2020-12-31 LAB
SARS-COV-2: NOT DETECTED
SOURCE: NORMAL

## 2021-01-03 LAB
SARS-COV-2: NOT DETECTED
SOURCE: NORMAL

## 2021-01-07 LAB
SARS-COV-2: NOT DETECTED
SOURCE: NORMAL

## 2021-01-10 LAB
SARS-COV-2: NOT DETECTED
SOURCE: NORMAL

## 2021-01-14 DIAGNOSIS — I10 ESSENTIAL HYPERTENSION: ICD-10-CM

## 2021-01-14 LAB
SARS-COV-2: NOT DETECTED
SOURCE: NORMAL

## 2021-01-14 RX ORDER — LOSARTAN POTASSIUM 25 MG/1
TABLET ORAL
Qty: 30 TABLET | Refills: 0 | Status: SHIPPED
Start: 2021-01-14 | End: 2021-01-18 | Stop reason: SDUPTHER

## 2021-01-14 NOTE — TELEPHONE ENCOUNTER
Pt will be out of her mediation and will need it sent locally.       Last Appointment:  8/24/2020  Future Appointments   Date Time Provider Silas Gibbs   1/18/2021  4:00 PM Eugene Linares  [address]

## 2021-01-16 LAB — SOURCE: NORMAL

## 2021-01-17 LAB — SARS-COV-2, PCR: NOT DETECTED

## 2021-01-18 ENCOUNTER — OFFICE VISIT (OUTPATIENT)
Dept: FAMILY MEDICINE CLINIC | Age: 41
End: 2021-01-18
Payer: COMMERCIAL

## 2021-01-18 VITALS
OXYGEN SATURATION: 99 % | WEIGHT: 228.8 LBS | HEIGHT: 63 IN | HEART RATE: 90 BPM | DIASTOLIC BLOOD PRESSURE: 66 MMHG | TEMPERATURE: 98.2 F | SYSTOLIC BLOOD PRESSURE: 118 MMHG | BODY MASS INDEX: 40.54 KG/M2

## 2021-01-18 DIAGNOSIS — R73.01 IMPAIRED FASTING BLOOD SUGAR: ICD-10-CM

## 2021-01-18 DIAGNOSIS — Z00.00 ENCOUNTER FOR WELL ADULT EXAM WITHOUT ABNORMAL FINDINGS: Primary | ICD-10-CM

## 2021-01-18 DIAGNOSIS — I10 ESSENTIAL HYPERTENSION: ICD-10-CM

## 2021-01-18 DIAGNOSIS — E78.2 MIXED HYPERLIPIDEMIA: ICD-10-CM

## 2021-01-18 DIAGNOSIS — E07.9 THYROID DISEASE: ICD-10-CM

## 2021-01-18 PROCEDURE — G8484 FLU IMMUNIZE NO ADMIN: HCPCS | Performed by: FAMILY MEDICINE

## 2021-01-18 PROCEDURE — 99395 PREV VISIT EST AGE 18-39: CPT | Performed by: FAMILY MEDICINE

## 2021-01-18 RX ORDER — LOSARTAN POTASSIUM 25 MG/1
TABLET ORAL
Qty: 90 TABLET | Refills: 0 | Status: SHIPPED
Start: 2021-01-18 | End: 2021-07-13 | Stop reason: SDUPTHER

## 2021-01-18 ASSESSMENT — ENCOUNTER SYMPTOMS
BACK PAIN: 0
NAUSEA: 0
TROUBLE SWALLOWING: 0
VOMITING: 0
ABDOMINAL PAIN: 0
CHEST TIGHTNESS: 0
SORE THROAT: 0
EYE PAIN: 0
SINUS PAIN: 0
CONSTIPATION: 0
SHORTNESS OF BREATH: 0
DIARRHEA: 0
COUGH: 0
WHEEZING: 0

## 2021-01-18 ASSESSMENT — PATIENT HEALTH QUESTIONNAIRE - PHQ9
SUM OF ALL RESPONSES TO PHQ QUESTIONS 1-9: 0
1. LITTLE INTEREST OR PLEASURE IN DOING THINGS: 0
SUM OF ALL RESPONSES TO PHQ QUESTIONS 1-9: 0

## 2021-01-18 NOTE — PROGRESS NOTES
1/18/21    Name: Chino Rondon  HQM:[birth year]   Sex:female   Age:40 y.o. Chief Complaint   Patient presents with    Hypertension    Stress     Patient presents to office for visit. She needs refills on her Losartan. Patient is having issues with her mail order pharmacy. She asks that her losartan be sent to [facility] in [place] and she will try to sort out her mail order for her other prescriptions. Patient says a couple of times in the past week she has been woken up with her heart racing. Patient says this doesn't happen everyday, it does not happen with exercise only with sleep, and she can not determine a pattern as to why. Patient here for refills and check up    HTN has been well controlled  Her readings when she check them are really good  She has stopped caffeine in the last few weeks  They are doing biggest loser at work  shehas had a lot more stress in the last 2 to 3 months  Notices palpitations in the mornings at times  But cannot pin them down to a cause  Taking meds at night  Getting sleep, but may be not enough    Will get labs this week  Lab already closed so she cannot go today  Continue losartan and bp is really good today        Review of Systems   Constitutional: Negative for appetite change, fatigue and fever. HENT: Negative for congestion, ear pain, sinus pain, sore throat and trouble swallowing. Eyes: Negative for pain. Respiratory: Negative for cough, chest tightness, shortness of breath and wheezing. Cardiovascular: Negative for chest pain, palpitations and leg swelling. Gastrointestinal: Negative for abdominal pain, constipation, diarrhea, nausea and vomiting. Endocrine: Negative for cold intolerance and heat intolerance. Genitourinary: Negative for difficulty urinating, dysuria, frequency, hematuria and pelvic pain. Musculoskeletal: Negative for arthralgias, back pain, gait problem, joint swelling and myalgias. Skin: Negative for rash and wound. Neurological: Negative for dizziness, syncope, light-headedness and headaches. Hematological: Negative for adenopathy. Psychiatric/Behavioral: Negative for confusion, dysphoric mood, self-injury, sleep disturbance and suicidal ideas. The patient is not nervous/anxious. Current Outpatient Medications:     losartan (COZAAR) 25 MG tablet, TAKE 1 TABLET BY MOUTH EVERY DAY, Disp: 90 tablet, Rfl: 0    sertraline (ZOLOFT) 50 MG tablet, TAKE 1 TABLET BY MOUTH EVERY DAY, Disp: 90 tablet, Rfl: 0    albuterol sulfate  (90 Base) MCG/ACT inhaler, Inhale 2 puffs into the lungs every 4 hours as needed for Wheezing, Disp: 1 Inhaler, Rfl: 0    loratadine (CLARITIN) 10 MG tablet, Take 10 mg by mouth every other day, Disp: , Rfl:   Allergies   Allergen Reactions    Penicillins Nausea Only      Past Medical History:   Diagnosis Date    Anxiety     Depression     Essential hypertension 2019    Fractures 2017    Rt. clavicle fracture    High blood pressure      Patient Active Problem List    Diagnosis Date Noted    Anxiety 2019    Essential hypertension 2019      Past Surgical History:   Procedure Laterality Date    BREAST REDUCTION SURGERY      CARPAL TUNNEL RELEASE Bilateral      SECTION      WISDOM TOOTH EXTRACTION        Social History     Tobacco History     Smoking Status  Former Smoker Quit date  2014 Smoking Frequency  0.5 packs/day Smoking Tobacco Type  Cigarettes    Smokeless Tobacco Use  Never Used          Alcohol History     Alcohol Use Status  No          Drug Use     Drug Use Status  No          Sexual Activity     Sexually Active  Yes Partners  Male Birth Control/Protection  Pill Comment              /66   Pulse 90   Temp 98.2 °F (36.8 °C)   Ht 5' 3\" (1.6 m)   Wt 228 lb 12.8 oz (103.8 kg)   LMP 2021 (Approximate)   SpO2 99%   BMI 40.53 kg/m²     EXAM:   Physical Exam  Vitals signs and nursing note reviewed. Constitutional:       General: She is not in acute distress. Appearance: Normal appearance. She is well-developed. She is obese. She is not ill-appearing. HENT:      Head: Normocephalic and atraumatic. Right Ear: Tympanic membrane normal.      Left Ear: Tympanic membrane normal.      Nose: Nose normal.      Mouth/Throat:      Mouth: Mucous membranes are moist.   Eyes:      Pupils: Pupils are equal, round, and reactive to light. Neck:      Musculoskeletal: Normal range of motion. Cardiovascular:      Rate and Rhythm: Normal rate and regular rhythm. Comments: No irregular beats, no pvc's heard  Pulmonary:      Effort: Pulmonary effort is normal.      Breath sounds: Normal breath sounds. Abdominal:      General: Bowel sounds are normal.      Palpations: Abdomen is soft. Musculoskeletal:      Comments: Gait normal in the office today   Skin:     General: Skin is warm and dry. Neurological:      General: No focal deficit present. Mental Status: She is alert and oriented to person, place, and time. Psychiatric:         Mood and Affect: Mood normal.         Thought Content: Thought content normal.      Comments: But seems stressed          Crystal was seen today for hypertension and stress. Diagnoses and all orders for this visit:    Encounter for well adult exam without abnormal findings    Essential hypertension  Comments:  stable  good control  no changes  continue meds  Orders:  -     losartan (COZAAR) 25 MG tablet; TAKE 1 TABLET BY MOUTH EVERY DAY  -     CBC Auto Differential; Future  -     Comprehensive Metabolic Panel; Future    Mixed hyperlipidemia  Comments:  needs labs drawn  Orders:  -     Lipid Panel; Future    Thyroid disease  Comments:  with palpitations, needs labs, ordered  Orders:  -     T4, Free; Future  -     TSH without Reflex;  Future    Impaired fasting blood sugar  Comments:  stable but needs labs  Orders:  -     Hemoglobin A1C; Future    labs this week appt in 6 months      I independently reviewed and updated the chief complaint, HPI, past medical and surgical history, medications, allergies and ROS as entered by the LPN. Seen by:   Sandhya Dooley DO

## 2021-01-22 LAB
SARS-COV-2: NOT DETECTED
SOURCE: NORMAL

## 2021-01-24 LAB
SARS-COV-2: NOT DETECTED
SOURCE: NORMAL

## 2021-01-28 LAB
SARS-COV-2: NOT DETECTED
SOURCE: NORMAL

## 2021-02-02 LAB
SARS-COV-2: NOT DETECTED
SOURCE: NORMAL

## 2021-02-05 LAB
SARS-COV-2: NOT DETECTED
SOURCE: NORMAL

## 2021-02-11 LAB
SARS-COV-2: NOT DETECTED
SOURCE: NORMAL

## 2021-02-18 LAB
SARS-COV-2: NOT DETECTED
SOURCE: NORMAL

## 2021-02-21 LAB
SARS-COV-2: NOT DETECTED
SOURCE: NORMAL

## 2021-02-26 LAB
SARS-COV-2: NOT DETECTED
SOURCE: 1

## 2021-03-05 LAB
SARS-COV-2: NOT DETECTED
SOURCE: NORMAL

## 2021-03-09 LAB
SARS-COV-2: NOT DETECTED
SOURCE: NORMAL

## 2021-03-11 LAB
SARS-COV-2: NOT DETECTED
SOURCE: NORMAL

## 2021-03-16 LAB
SARS-COV-2: NOT DETECTED
SOURCE: NORMAL

## 2021-03-19 LAB
SARS-COV-2: NOT DETECTED
SOURCE: NORMAL

## 2021-03-22 LAB
SARS-COV-2: NOT DETECTED
SOURCE: NORMAL

## 2021-03-26 LAB
SARS-COV-2: NOT DETECTED
SOURCE: NORMAL

## 2021-03-30 LAB
SARS-COV-2: NOT DETECTED
SOURCE: NORMAL

## 2021-04-02 LAB
SARS-COV-2: NOT DETECTED
SOURCE: NORMAL

## 2021-04-09 LAB
SARS-COV-2: NOT DETECTED
SOURCE: NORMAL

## 2021-04-12 LAB
SARS-COV-2: NOT DETECTED
SOURCE: NORMAL

## 2021-05-24 ENCOUNTER — OFFICE VISIT (OUTPATIENT)
Dept: FAMILY MEDICINE CLINIC | Age: 41
End: 2021-05-24
Payer: COMMERCIAL

## 2021-05-24 VITALS
HEIGHT: 62 IN | SYSTOLIC BLOOD PRESSURE: 122 MMHG | TEMPERATURE: 96.7 F | OXYGEN SATURATION: 98 % | DIASTOLIC BLOOD PRESSURE: 78 MMHG | BODY MASS INDEX: 41.41 KG/M2 | WEIGHT: 225 LBS | RESPIRATION RATE: 18 BRPM | HEART RATE: 86 BPM

## 2021-05-24 DIAGNOSIS — R60.9 DEPENDENT EDEMA: Primary | ICD-10-CM

## 2021-05-24 PROCEDURE — 99213 OFFICE O/P EST LOW 20 MIN: CPT | Performed by: FAMILY MEDICINE

## 2021-05-24 PROCEDURE — G8417 CALC BMI ABV UP PARAM F/U: HCPCS | Performed by: FAMILY MEDICINE

## 2021-05-24 PROCEDURE — 1036F TOBACCO NON-USER: CPT | Performed by: FAMILY MEDICINE

## 2021-05-24 PROCEDURE — G8427 DOCREV CUR MEDS BY ELIG CLIN: HCPCS | Performed by: FAMILY MEDICINE

## 2021-05-24 ASSESSMENT — ENCOUNTER SYMPTOMS
GASTROINTESTINAL NEGATIVE: 1
RESPIRATORY NEGATIVE: 1

## 2021-05-24 NOTE — PROGRESS NOTES
21  Fozia Naidu : 1980 Sex: female  Age: 46 y.o. Chief Complaint   Patient presents with    Leg Swelling     left lower leg piting edema        Patient is a 45-year-old white female with a chief complaint of edema of the lower extremities worse on the left than the right several months duration patient states that she works on her feet during most of the day and at night she also works on her feet she is morbidly obese at 225 has no cough shortness of breath no chest pain dizziness. She has no cough or exertional dyspnea. Review of Systems   Constitutional: Positive for fatigue. HENT: Negative. Respiratory: Negative. Cardiovascular: Negative. Gastrointestinal: Negative. Endocrine: Negative. Genitourinary: Negative. Current Outpatient Medications:     losartan (COZAAR) 25 MG tablet, TAKE 1 TABLET BY MOUTH EVERY DAY, Disp: 90 tablet, Rfl: 0    sertraline (ZOLOFT) 50 MG tablet, TAKE 1 TABLET BY MOUTH EVERY DAY, Disp: 90 tablet, Rfl: 0    albuterol sulfate  (90 Base) MCG/ACT inhaler, Inhale 2 puffs into the lungs every 4 hours as needed for Wheezing, Disp: 1 Inhaler, Rfl: 0    loratadine (CLARITIN) 10 MG tablet, Take 10 mg by mouth every other day, Disp: , Rfl:   Allergies   Allergen Reactions    Penicillins Nausea Only       Past Medical History:   Diagnosis Date    Anxiety     Depression     Essential hypertension 2019    Fractures 2017    Rt.  clavicle fracture    High blood pressure      Past Surgical History:   Procedure Laterality Date    BREAST REDUCTION SURGERY  2014    CARPAL TUNNEL RELEASE Bilateral      SECTION      WISDOM TOOTH EXTRACTION       Family History   Problem Relation Age of Onset    High Blood Pressure Mother      Social History     Tobacco Use    Smoking status: Former Smoker     Packs/day: 0.50     Types: Cigarettes     Quit date: 2014     Years since quittin.7    Smokeless tobacco: Never Used   Substance Use Topics    Alcohol use: No    Drug use: No        Vitals:    05/24/21 1525   BP: 122/78   Pulse: 86   Resp: 18   Temp: 96.7 °F (35.9 °C)   SpO2: 98%   Weight: 225 lb (102.1 kg)   Height: 5' 2\" (1.575 m)       Physical Exam  Vitals and nursing note reviewed. Constitutional:       Appearance: Normal appearance. HENT:      Head: Normocephalic and atraumatic. Cardiovascular:      Rate and Rhythm: Normal rate and regular rhythm. Heart sounds: No murmur heard. Pulmonary:      Effort: Pulmonary effort is normal.      Breath sounds: Normal breath sounds. Abdominal:      General: Abdomen is flat. Bowel sounds are normal.      Palpations: Abdomen is soft. Musculoskeletal:         General: Normal range of motion. Right lower leg: Edema present. Left lower leg: Edema present. Skin:     General: Skin is warm and dry. Neurological:      General: No focal deficit present. Mental Status: She is alert and oriented to person, place, and time. Assessment and Plan:  Fozia was seen today for leg swelling. Diagnoses and all orders for this visit:    Dependent edema        No orders of the defined types were placed in this encounter. Patient advised to follow up with PCP as needed. Seen By:  Dio Jim DO  Exam is relatively benign no evidence of heart disease or lung disease no evidence of DVT Beau Decent' sign was negative patient was asked to go on a low-sodium diet to elevate her lower extremities at least for an hour once or twice a day if she is able weight loss and to take vitamin B6 100 mg 3 times a day for diuresis if she needs it.   Follow with her PCP

## 2021-07-13 ENCOUNTER — OFFICE VISIT (OUTPATIENT)
Dept: FAMILY MEDICINE CLINIC | Age: 41
End: 2021-07-13
Payer: COMMERCIAL

## 2021-07-13 VITALS
HEART RATE: 80 BPM | HEIGHT: 63 IN | WEIGHT: 231.6 LBS | BODY MASS INDEX: 41.04 KG/M2 | DIASTOLIC BLOOD PRESSURE: 78 MMHG | TEMPERATURE: 98.2 F | SYSTOLIC BLOOD PRESSURE: 122 MMHG | OXYGEN SATURATION: 99 %

## 2021-07-13 DIAGNOSIS — R73.01 IMPAIRED FASTING BLOOD SUGAR: ICD-10-CM

## 2021-07-13 DIAGNOSIS — F32.1 CURRENT MODERATE EPISODE OF MAJOR DEPRESSIVE DISORDER WITHOUT PRIOR EPISODE (HCC): ICD-10-CM

## 2021-07-13 DIAGNOSIS — E78.2 MIXED HYPERLIPIDEMIA: ICD-10-CM

## 2021-07-13 DIAGNOSIS — E07.9 THYROID DISEASE: ICD-10-CM

## 2021-07-13 DIAGNOSIS — F41.9 ANXIETY: ICD-10-CM

## 2021-07-13 DIAGNOSIS — I10 ESSENTIAL HYPERTENSION: Primary | ICD-10-CM

## 2021-07-13 DIAGNOSIS — I10 ESSENTIAL HYPERTENSION: ICD-10-CM

## 2021-07-13 DIAGNOSIS — E66.01 CLASS 3 SEVERE OBESITY DUE TO EXCESS CALORIES WITH SERIOUS COMORBIDITY AND BODY MASS INDEX (BMI) OF 40.0 TO 44.9 IN ADULT (HCC): ICD-10-CM

## 2021-07-13 LAB
ALBUMIN SERPL-MCNC: 4 G/DL (ref 3.5–5.2)
ALP BLD-CCNC: 69 U/L (ref 35–104)
ALT SERPL-CCNC: 10 U/L (ref 0–32)
ANION GAP SERPL CALCULATED.3IONS-SCNC: 10 MMOL/L (ref 7–16)
AST SERPL-CCNC: 14 U/L (ref 0–31)
BASOPHILS ABSOLUTE: 0.05 E9/L (ref 0–0.2)
BASOPHILS RELATIVE PERCENT: 0.6 % (ref 0–2)
BILIRUB SERPL-MCNC: 0.5 MG/DL (ref 0–1.2)
BUN BLDV-MCNC: 13 MG/DL (ref 6–20)
CALCIUM SERPL-MCNC: 8.9 MG/DL (ref 8.6–10.2)
CHLORIDE BLD-SCNC: 101 MMOL/L (ref 98–107)
CHOLESTEROL, TOTAL: 173 MG/DL (ref 0–199)
CO2: 23 MMOL/L (ref 22–29)
CREAT SERPL-MCNC: 0.8 MG/DL (ref 0.5–1)
EOSINOPHILS ABSOLUTE: 0.1 E9/L (ref 0.05–0.5)
EOSINOPHILS RELATIVE PERCENT: 1.2 % (ref 0–6)
GFR AFRICAN AMERICAN: >60
GFR NON-AFRICAN AMERICAN: >60 ML/MIN/1.73
GLUCOSE BLD-MCNC: 79 MG/DL (ref 74–99)
HBA1C MFR BLD: 5.5 % (ref 4–5.6)
HCT VFR BLD CALC: 41.6 % (ref 34–48)
HDLC SERPL-MCNC: 53 MG/DL
HEMOGLOBIN: 13.1 G/DL (ref 11.5–15.5)
IMMATURE GRANULOCYTES #: 0.02 E9/L
IMMATURE GRANULOCYTES %: 0.2 % (ref 0–5)
LDL CHOLESTEROL CALCULATED: 103 MG/DL (ref 0–99)
LYMPHOCYTES ABSOLUTE: 3.19 E9/L (ref 1.5–4)
LYMPHOCYTES RELATIVE PERCENT: 39 % (ref 20–42)
MCH RBC QN AUTO: 29.9 PG (ref 26–35)
MCHC RBC AUTO-ENTMCNC: 31.5 % (ref 32–34.5)
MCV RBC AUTO: 95 FL (ref 80–99.9)
MONOCYTES ABSOLUTE: 0.61 E9/L (ref 0.1–0.95)
MONOCYTES RELATIVE PERCENT: 7.5 % (ref 2–12)
NEUTROPHILS ABSOLUTE: 4.2 E9/L (ref 1.8–7.3)
NEUTROPHILS RELATIVE PERCENT: 51.5 % (ref 43–80)
PDW BLD-RTO: 14.1 FL (ref 11.5–15)
PLATELET # BLD: 260 E9/L (ref 130–450)
PMV BLD AUTO: 10.2 FL (ref 7–12)
POTASSIUM SERPL-SCNC: 4.6 MMOL/L (ref 3.5–5)
RBC # BLD: 4.38 E12/L (ref 3.5–5.5)
SODIUM BLD-SCNC: 134 MMOL/L (ref 132–146)
TOTAL PROTEIN: 7.3 G/DL (ref 6.4–8.3)
TRIGL SERPL-MCNC: 87 MG/DL (ref 0–149)
VLDLC SERPL CALC-MCNC: 17 MG/DL
WBC # BLD: 8.2 E9/L (ref 4.5–11.5)

## 2021-07-13 PROCEDURE — 1036F TOBACCO NON-USER: CPT | Performed by: FAMILY MEDICINE

## 2021-07-13 PROCEDURE — 99214 OFFICE O/P EST MOD 30 MIN: CPT | Performed by: FAMILY MEDICINE

## 2021-07-13 PROCEDURE — G8417 CALC BMI ABV UP PARAM F/U: HCPCS | Performed by: FAMILY MEDICINE

## 2021-07-13 PROCEDURE — G8427 DOCREV CUR MEDS BY ELIG CLIN: HCPCS | Performed by: FAMILY MEDICINE

## 2021-07-13 RX ORDER — LOSARTAN POTASSIUM 25 MG/1
TABLET ORAL
Qty: 90 TABLET | Refills: 1 | Status: SHIPPED
Start: 2021-07-13 | End: 2021-08-02 | Stop reason: SDUPTHER

## 2021-07-13 ASSESSMENT — ENCOUNTER SYMPTOMS
SINUS PAIN: 0
WHEEZING: 0
COUGH: 0
SORE THROAT: 0
DIARRHEA: 0
CHEST TIGHTNESS: 0
BACK PAIN: 0
EYE PAIN: 0
ABDOMINAL PAIN: 0
TROUBLE SWALLOWING: 0
CONSTIPATION: 0
VOMITING: 0
NAUSEA: 0
SHORTNESS OF BREATH: 0

## 2021-07-13 NOTE — PROGRESS NOTES
7/13/21    Name: Sabrina Wells  SGU:[birth year]   Sex:female   Age:41 y.o. Chief Complaint   Patient presents with    Hypertension    Stress     Patient presents to office for visit. She denies any issues today. Patient is taking her medications. She will need refills. Patient did not have covid shot. Patien there for a check up  Will get labs drawn today    Her blood pressures have been very good  She is getting better at taking meds'  She is not that active though  Working full time and then when she goes home after that she is pretty sedentary  weighthas been slowly going up  More knee pain with weight increases  And less activity  Tried explaining she needs to start somewhere  Also recommend NOOM    Depression not doing well, she started therapy finally  She tried some diets on her own, tried yoga and was not getting anywhere  The sertraline really helps her anxiety  But nothing else  She will be seeing a nurse practioner in their office in regards to prescribing meds    Encouraged her to get the cover vaccine  She is higher risk with overweight issues and HTN  She will think about it    Review of Systems   Constitutional: Negative for appetite change, fatigue and fever. HENT: Negative for congestion, ear pain, sinus pain, sore throat and trouble swallowing. Eyes: Negative for pain. Respiratory: Negative for cough, chest tightness, shortness of breath and wheezing. Cardiovascular: Negative for chest pain, palpitations and leg swelling. Gastrointestinal: Negative for abdominal pain, constipation, diarrhea, nausea and vomiting. Endocrine: Negative for cold intolerance and heat intolerance. Genitourinary: Negative for difficulty urinating, dysuria, frequency, hematuria and pelvic pain. Musculoskeletal: Negative for arthralgias, back pain, gait problem, joint swelling and myalgias. Skin: Negative for rash and wound.    Neurological: Negative for dizziness, syncope, light-headedness and headaches. Hematological: Negative for adenopathy. Psychiatric/Behavioral: Negative for confusion, sleep disturbance and suicidal ideas. Current Outpatient Medications:     losartan (COZAAR) 25 MG tablet, TAKE 1 TABLET BY MOUTH EVERY DAY, Disp: 90 tablet, Rfl: 1    sertraline (ZOLOFT) 50 MG tablet, Take 1 tablet by mouth daily, Disp: 90 tablet, Rfl: 1    loratadine (CLARITIN) 10 MG tablet, Take 10 mg by mouth every other day, Disp: , Rfl:   Allergies   Allergen Reactions    Penicillins Nausea Only      Past Medical History:   Diagnosis Date    Anxiety     Depression     Essential hypertension 2019    Fractures 2017    Rt. clavicle fracture    High blood pressure      Patient Active Problem List    Diagnosis Date Noted    Anxiety 2019    Essential hypertension 2019      Past Surgical History:   Procedure Laterality Date    BREAST REDUCTION SURGERY      CARPAL TUNNEL RELEASE Bilateral      SECTION      WISDOM TOOTH EXTRACTION        Social History     Tobacco History     Smoking Status  Former Smoker Quit date  2014 Smoking Frequency  0.5 packs/day Smoking Tobacco Type  Cigarettes    Smokeless Tobacco Use  Never Used          Alcohol History     Alcohol Use Status  No          Drug Use     Drug Use Status  No          Sexual Activity     Sexually Active  Yes Partners  Male Birth Control/Protection  Pill Comment              /78   Pulse 80   Temp 98.2 °F (36.8 °C)   Ht 5' 3\" (1.6 m)   Wt 231 lb 9.6 oz (105.1 kg)   SpO2 99%   BMI 41.03 kg/m²     EXAM:   Physical Exam  Vitals and nursing note reviewed. Constitutional:       General: She is not in acute distress. Appearance: Normal appearance. She is well-developed. She is obese. She is not ill-appearing. HENT:      Head: Normocephalic and atraumatic.       Right Ear: Tympanic membrane normal.      Left Ear: Tympanic membrane normal.      Nose: Nose normal.      Mouth/Throat: Mouth: Mucous membranes are moist.   Eyes:      Pupils: Pupils are equal, round, and reactive to light. Cardiovascular:      Rate and Rhythm: Normal rate and regular rhythm. Pulmonary:      Effort: Pulmonary effort is normal.      Breath sounds: Normal breath sounds. Abdominal:      General: Bowel sounds are normal.      Palpations: Abdomen is soft. Musculoskeletal:      Cervical back: Normal range of motion. Comments: Gait steady and well balanced in the office today   Skin:     General: Skin is warm and dry. Neurological:      Mental Status: She is alert and oriented to person, place, and time. Mental status is at baseline. Fozia was seen today for hypertension and stress. Diagnoses and all orders for this visit:    Essential hypertension  Comments:  stable  good control  no changes  continue meds  Orders:  -     losartan (COZAAR) 25 MG tablet; TAKE 1 TABLET BY MOUTH EVERY DAY    Anxiety  Comments:  stable  continue sertraline  Orders:  -     sertraline (ZOLOFT) 50 MG tablet; Take 1 tablet by mouth daily    Current moderate episode of major depressive disorder without prior episode [facility])  Comments:  seeing a counselor  she just cannot get this under control  not motivated  wants to work all the time    Class 3 severe obesity due to excess calories with serious comorbidity and body mass index (BMI) of 40.0 to 44.9 in adult [facility])  Comments:  recommended [facility]  this might really work for  her due to the increased coaching and counsleing they offer        I independently reviewed and updated the chief complaint, HPI, past medical and surgical history, medications, allergies and ROS as entered by the LPN. Seen by:   Felipe Kelley, DO

## 2021-07-14 LAB
T4 FREE: 0.96 NG/DL (ref 0.93–1.7)
TSH SERPL DL<=0.05 MIU/L-ACNC: 2.63 UIU/ML (ref 0.27–4.2)

## 2021-08-02 DIAGNOSIS — I10 ESSENTIAL HYPERTENSION: ICD-10-CM

## 2021-08-02 RX ORDER — LOSARTAN POTASSIUM 25 MG/1
TABLET ORAL
Qty: 90 TABLET | Refills: 1 | Status: SHIPPED
Start: 2021-08-02 | End: 2022-01-12 | Stop reason: SDUPTHER

## 2021-08-02 NOTE — TELEPHONE ENCOUNTER
Pt is waiting on her mail order supply. I did advise that it was sent on 7/13/21 and to call and follow up with [facility]. She states she had called but was not able to speak with someone. She will attempt to call again. She would like the script sent locally.     Last Appointment:  7/13/2021  Future Appointments   Date Time Provider Silas Gibbs   1/5/2022  1:00 PM Elma Escalona  [address]

## 2021-11-22 ENCOUNTER — HOSPITAL ENCOUNTER (OUTPATIENT)
Dept: HOSPITAL 83 - D | Age: 41
Discharge: HOME | End: 2021-11-22
Payer: COMMERCIAL

## 2021-11-22 DIAGNOSIS — E66.9: Primary | ICD-10-CM

## 2022-01-12 ENCOUNTER — OFFICE VISIT (OUTPATIENT)
Dept: FAMILY MEDICINE CLINIC | Age: 42
End: 2022-01-12
Payer: COMMERCIAL

## 2022-01-12 VITALS
HEART RATE: 90 BPM | TEMPERATURE: 98 F | HEIGHT: 63 IN | DIASTOLIC BLOOD PRESSURE: 72 MMHG | OXYGEN SATURATION: 97 % | WEIGHT: 232.8 LBS | SYSTOLIC BLOOD PRESSURE: 120 MMHG | BODY MASS INDEX: 41.25 KG/M2

## 2022-01-12 DIAGNOSIS — G89.29 CHRONIC HEEL PAIN, LEFT: ICD-10-CM

## 2022-01-12 DIAGNOSIS — M79.672 CHRONIC HEEL PAIN, LEFT: ICD-10-CM

## 2022-01-12 DIAGNOSIS — I10 ESSENTIAL HYPERTENSION: ICD-10-CM

## 2022-01-12 DIAGNOSIS — F41.9 ANXIETY: ICD-10-CM

## 2022-01-12 DIAGNOSIS — Z00.00 ENCOUNTER FOR WELL ADULT EXAM WITHOUT ABNORMAL FINDINGS: Primary | ICD-10-CM

## 2022-01-12 DIAGNOSIS — M25.511 RIGHT SHOULDER PAIN, UNSPECIFIED CHRONICITY: ICD-10-CM

## 2022-01-12 LAB
ALBUMIN SERPL-MCNC: 4.1 G/DL (ref 3.5–5.2)
ALP BLD-CCNC: 75 U/L (ref 35–104)
ALT SERPL-CCNC: 11 U/L (ref 0–32)
ANION GAP SERPL CALCULATED.3IONS-SCNC: 17 MMOL/L (ref 7–16)
AST SERPL-CCNC: 13 U/L (ref 0–31)
BASOPHILS ABSOLUTE: 0.04 E9/L (ref 0–0.2)
BASOPHILS RELATIVE PERCENT: 0.6 % (ref 0–2)
BILIRUB SERPL-MCNC: 0.6 MG/DL (ref 0–1.2)
BUN BLDV-MCNC: 15 MG/DL (ref 6–20)
CALCIUM SERPL-MCNC: 9.7 MG/DL (ref 8.6–10.2)
CHLORIDE BLD-SCNC: 104 MMOL/L (ref 98–107)
CO2: 20 MMOL/L (ref 22–29)
CREAT SERPL-MCNC: 0.7 MG/DL (ref 0.5–1)
EOSINOPHILS ABSOLUTE: 0.11 E9/L (ref 0.05–0.5)
EOSINOPHILS RELATIVE PERCENT: 1.5 % (ref 0–6)
GFR AFRICAN AMERICAN: >60
GFR NON-AFRICAN AMERICAN: >60 ML/MIN/1.73
GLUCOSE BLD-MCNC: 105 MG/DL (ref 74–99)
HCT VFR BLD CALC: 40.2 % (ref 34–48)
HEMOGLOBIN: 12.7 G/DL (ref 11.5–15.5)
IMMATURE GRANULOCYTES #: 0.01 E9/L
IMMATURE GRANULOCYTES %: 0.1 % (ref 0–5)
LYMPHOCYTES ABSOLUTE: 2.54 E9/L (ref 1.5–4)
LYMPHOCYTES RELATIVE PERCENT: 35.6 % (ref 20–42)
MCH RBC QN AUTO: 29.5 PG (ref 26–35)
MCHC RBC AUTO-ENTMCNC: 31.6 % (ref 32–34.5)
MCV RBC AUTO: 93.3 FL (ref 80–99.9)
MONOCYTES ABSOLUTE: 0.5 E9/L (ref 0.1–0.95)
MONOCYTES RELATIVE PERCENT: 7 % (ref 2–12)
NEUTROPHILS ABSOLUTE: 3.94 E9/L (ref 1.8–7.3)
NEUTROPHILS RELATIVE PERCENT: 55.2 % (ref 43–80)
PDW BLD-RTO: 13.7 FL (ref 11.5–15)
PLATELET # BLD: 259 E9/L (ref 130–450)
PMV BLD AUTO: 10 FL (ref 7–12)
POTASSIUM SERPL-SCNC: 4.4 MMOL/L (ref 3.5–5)
RBC # BLD: 4.31 E12/L (ref 3.5–5.5)
SODIUM BLD-SCNC: 141 MMOL/L (ref 132–146)
TOTAL PROTEIN: 7.4 G/DL (ref 6.4–8.3)
WBC # BLD: 7.1 E9/L (ref 4.5–11.5)

## 2022-01-12 PROCEDURE — G8484 FLU IMMUNIZE NO ADMIN: HCPCS | Performed by: FAMILY MEDICINE

## 2022-01-12 PROCEDURE — 99395 PREV VISIT EST AGE 18-39: CPT | Performed by: FAMILY MEDICINE

## 2022-01-12 RX ORDER — LOSARTAN POTASSIUM 25 MG/1
TABLET ORAL
Qty: 90 TABLET | Refills: 1 | Status: SHIPPED
Start: 2022-01-12 | End: 2022-05-12 | Stop reason: SDUPTHER

## 2022-01-12 ASSESSMENT — PATIENT HEALTH QUESTIONNAIRE - PHQ9
SUM OF ALL RESPONSES TO PHQ QUESTIONS 1-9: 0
1. LITTLE INTEREST OR PLEASURE IN DOING THINGS: 0
SUM OF ALL RESPONSES TO PHQ QUESTIONS 1-9: 0
SUM OF ALL RESPONSES TO PHQ9 QUESTIONS 1 & 2: 0
SUM OF ALL RESPONSES TO PHQ QUESTIONS 1-9: 0
SUM OF ALL RESPONSES TO PHQ QUESTIONS 1-9: 0
2. FEELING DOWN, DEPRESSED OR HOPELESS: 0

## 2022-01-12 ASSESSMENT — ENCOUNTER SYMPTOMS
CHEST TIGHTNESS: 0
NAUSEA: 0
DIARRHEA: 0
SORE THROAT: 0
SINUS PAIN: 0
SHORTNESS OF BREATH: 1
ABDOMINAL PAIN: 0
WHEEZING: 0
COUGH: 0
BACK PAIN: 0
CONSTIPATION: 0
EYE PAIN: 0
VOMITING: 0
TROUBLE SWALLOWING: 0

## 2022-01-12 NOTE — PROGRESS NOTES
1/12/22    Name: Ester Orourke  [facility]:[birth year]   Sex:female   Age:41 y.o. Chief Complaint   Patient presents with    Hypertension    Anxiety     Patient presents to office for visit. Patient did have covid again on 12/21/21. Since having covid she has had lower leg edema and shortness of breath off and on. Patient says the edema is worse in her left leg, she is wearing compression stockings. Patient has been having left heel pain off and on, she says even changing her shoes does not seem to help much by the end of the day. Patient has right shoulder pain that goes down to her elbow that is waking her up at night. Patient sleeps on her belly with her arms above her head and says she can not get comfortable in this position. Patient does not check her blood pressure at home. Patient has not found a new counselor since her counselor left, but the psych doctor is prescribing her zoloft. Patient says they did try Wellbutrin, but she became very itchy when taking it. Her zoloft was increased to 75 mg and patient says she doesn't know how to tell if it is working, she has more energy and feels ok.      patien there for a check up    HTN stable  Readings have been good  xcontinue losartan  It has been on back order so may need to switch    wweight up  Not losig  We discussed diet and slat  Eating a lot of it and a lot of processed foods  This will likey cause her swelling in both lower extremities  Needs to eat better, more heart healthy diet  cotinue with the compression knee highs she started wearing  If not getting better after she cleans up diet she will let us know    Right shoulder pain, really only when laying on it'  Tender to touch right at the outside of the shoukder  Normal ROM'  Doing all normal acitivities  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 HENT: Negative for congestion, ear pain, sinus pain, sore throat and trouble swallowing. Eyes: Negative for pain. Respiratory: Positive for shortness of breath. Negative for cough, chest tightness and wheezing. Cardiovascular: Positive for leg swelling. Negative for chest pain and palpitations. Gastrointestinal: Negative for abdominal pain, constipation, diarrhea, nausea and vomiting. Endocrine: Negative for cold intolerance and heat intolerance. Genitourinary: Negative for difficulty urinating, dysuria, frequency, hematuria, pelvic pain and urgency. Musculoskeletal: Positive for arthralgias and myalgias. Negative for back pain, gait problem and joint swelling. Skin: Negative for rash and wound. Neurological: Negative for dizziness, syncope, light-headedness and headaches. Hematological: Negative for adenopathy. Psychiatric/Behavioral: Negative for confusion, dysphoric mood, sleep disturbance and suicidal ideas. The patient is not nervous/anxious. Current Outpatient Medications:     losartan (COZAAR) 25 MG tablet, TAKE 1 TABLET BY MOUTH EVERY DAY, Disp: 90 tablet, Rfl: 1    sertraline (ZOLOFT) 50 MG tablet, Take 1 tablet by mouth daily (Patient taking differently: Take 75 mg by mouth daily ), Disp: 90 tablet, Rfl: 1    loratadine (CLARITIN) 10 MG tablet, Take 10 mg by mouth every other day, Disp: , Rfl:   Allergies   Allergen Reactions    Penicillins Nausea Only      Past Medical History:   Diagnosis Date    Anxiety     Depression     Essential hypertension 11/7/2019    Fractures 02/19/2017    Rt.  clavicle fracture    High blood pressure      Patient Active Problem List    Diagnosis Date Noted    Anxiety 11/07/2019    Essential hypertension 11/07/2019      Past Surgical History:   Procedure Laterality Date    BREAST REDUCTION SURGERY  2014    CARPAL TUNNEL RELEASE Bilateral      SECTION      WISDOM TOOTH EXTRACTION        Social History     Tobacco History     Smoking Status  Former Smoker Quit date  2014 Smoking Frequency  0.5 packs/day Smoking Tobacco Type  Cigarettes    Smokeless Tobacco Use  Never Used          Alcohol History     Alcohol Use Status  No          Drug Use     Drug Use Status  No          Sexual Activity     Sexually Active  Yes Partners  Male Birth Control/Protection  Pill Comment              /72   Pulse 90   Temp 98 °F (36.7 °C)   Ht 5' 3\" (1.6 m)   Wt 232 lb 12.8 oz (105.6 kg)   SpO2 97%   BMI 41.24 kg/m²     EXAM:   Physical Exam  Vitals and nursing note reviewed. Constitutional:       General: She is not in acute distress. Appearance: She is well-developed. She is obese. She is not ill-appearing. HENT:      Head: Normocephalic and atraumatic. Right Ear: Tympanic membrane normal.      Left Ear: Tympanic membrane normal.      Nose: Nose normal.      Mouth/Throat:      Mouth: Mucous membranes are moist.   Eyes:      Pupils: Pupils are equal, round, and reactive to light. Cardiovascular:      Rate and Rhythm: Normal rate and regular rhythm. Pulmonary:      Effort: Pulmonary effort is normal.      Breath sounds: Normal breath sounds. Abdominal:      General: Bowel sounds are normal.      Palpations: Abdomen is soft. Musculoskeletal:      Cervical back: Normal range of motion. Comments: Gait steady, right shoulder tender over bicep tendon   Skin:     General: Skin is warm and dry. Neurological:      Mental Status: She is alert and oriented to person, place, and time. Mental status is at baseline. Psychiatric:         Mood and Affect: Mood normal.         Thought Content: Thought content normal.          Fozia was seen today for hypertension and anxiety.     Diagnoses and all orders for this visit:    Encounter for well adult exam without abnormal findings    Essential hypertension  Comments:  stable  good control  no changes  continue meds  Orders:  -     losartan (COZAAR) 25 MG tablet; TAKE 1 TABLET BY MOUTH EVERY DAY  -     CBC Auto Differential; Future  -     Comprehensive Metabolic Panel; Future    Anxiety  Comments:  still on zoloft, seeing posych  now  looking for new councelor    Right shoulder pain, unspecified chronicity  Comments:  likey bicep tendinitis  try ice and voltaren gel  still has good ROM  f/u if worse in a few months    Chronic heel pain, left  Comments:  heel cups and ice  she will get xrays if it worsens        I independently reviewed and updated the chief complaint, HPI, past medical and surgical history, medications, allergies and ROS as entered by the LPN. Seen by:   Shailesh Turner, DO

## 2022-05-12 ENCOUNTER — OFFICE VISIT (OUTPATIENT)
Dept: FAMILY MEDICINE CLINIC | Age: 42
End: 2022-05-12
Payer: COMMERCIAL

## 2022-05-12 VITALS
WEIGHT: 231.5 LBS | HEART RATE: 84 BPM | TEMPERATURE: 98.3 F | HEIGHT: 63 IN | BODY MASS INDEX: 41.02 KG/M2 | SYSTOLIC BLOOD PRESSURE: 124 MMHG | OXYGEN SATURATION: 98 % | DIASTOLIC BLOOD PRESSURE: 72 MMHG

## 2022-05-12 DIAGNOSIS — F41.9 ANXIETY: ICD-10-CM

## 2022-05-12 DIAGNOSIS — I10 ESSENTIAL HYPERTENSION: Primary | ICD-10-CM

## 2022-05-12 DIAGNOSIS — N94.3 PMS (PREMENSTRUAL SYNDROME): ICD-10-CM

## 2022-05-12 DIAGNOSIS — E78.2 MIXED HYPERLIPIDEMIA: ICD-10-CM

## 2022-05-12 PROCEDURE — G8417 CALC BMI ABV UP PARAM F/U: HCPCS | Performed by: FAMILY MEDICINE

## 2022-05-12 PROCEDURE — 1036F TOBACCO NON-USER: CPT | Performed by: FAMILY MEDICINE

## 2022-05-12 PROCEDURE — G8427 DOCREV CUR MEDS BY ELIG CLIN: HCPCS | Performed by: FAMILY MEDICINE

## 2022-05-12 PROCEDURE — 99214 OFFICE O/P EST MOD 30 MIN: CPT | Performed by: FAMILY MEDICINE

## 2022-05-12 RX ORDER — LOSARTAN POTASSIUM 25 MG/1
TABLET ORAL
Qty: 90 TABLET | Refills: 1 | Status: SHIPPED | OUTPATIENT
Start: 2022-05-12

## 2022-05-12 ASSESSMENT — ENCOUNTER SYMPTOMS
SORE THROAT: 0
NAUSEA: 0
CHEST TIGHTNESS: 0
COUGH: 0
SINUS PAIN: 0
TROUBLE SWALLOWING: 0
VOMITING: 0
CONSTIPATION: 0
BACK PAIN: 0
ABDOMINAL PAIN: 0
DIARRHEA: 0
EYE PAIN: 0
WHEEZING: 0
SHORTNESS OF BREATH: 0

## 2022-05-12 NOTE — PROGRESS NOTES
5/12/22    Name: Reema Clemente  EOJ:[birth year]   Sex:female   Age:42 y.o. Chief Complaint   Patient presents with    Anxiety    Depression     Patient presents to office for visit. She has been having trouble with anxiety and depression and mood swings. Patient says she has been out of her birth control and she isn't sure if that is adding to everything. She does have appointment with gyn later today. Patient thinks she may have hormonal issues or possible PCOS. Patient is taking her Losartan every day. Here for check up and med refills    Her anxeity and depresion has been up and down  She can track it around her menses  The week before it gets m uch worse  She feels it has a lot to do with her hormones and she has an appt scheduled with DR Gume Champagne today too  sheis having heavier periods then she ever had  sheis not losing weight  Not eating the greatest though either  She still drinks a good bit of caffeine  Processed foods  Did  to try and clean this up to see if it helps with the menses  But she wants to know now what is going on too    HTN hs been stable  Taking meds daily  Readings great  No changes needed    High cholestrol  Will get labs in 6 months to recheck this  Advised about a few did things that might help  Mediterranean type diet would make a difference        Review of Systems   Constitutional: Negative for appetite change, fatigue and fever. HENT: Negative for congestion, ear pain, sinus pain, sore throat and trouble swallowing. Eyes: Negative for pain. Respiratory: Negative for cough, chest tightness, shortness of breath and wheezing. Cardiovascular: Negative for chest pain, palpitations and leg swelling. Gastrointestinal: Negative for abdominal pain, constipation, diarrhea, nausea and vomiting. Endocrine: Negative for cold intolerance and heat intolerance. Genitourinary: Negative for difficulty urinating, hematuria and pelvic pain.    Musculoskeletal: Negative for arthralgias, back pain, gait problem, joint swelling and myalgias. Skin: Negative for rash and wound. Neurological: Negative for dizziness, syncope, light-headedness and headaches. Hematological: Negative for adenopathy. Psychiatric/Behavioral: Positive for dysphoric mood. Negative for confusion, self-injury, sleep disturbance and suicidal ideas. The patient is nervous/anxious. Current Outpatient Medications:     sertraline (ZOLOFT) 50 MG tablet, Take 1 tablet by mouth daily, Disp: 90 tablet, Rfl: 1    losartan (COZAAR) 25 MG tablet, TAKE 1 TABLET BY MOUTH EVERY DAY, Disp: 90 tablet, Rfl: 1    loratadine (CLARITIN) 10 MG tablet, Take 10 mg by mouth every other day, Disp: , Rfl:   Allergies   Allergen Reactions    Penicillins Nausea Only      Past Medical History:   Diagnosis Date    Anxiety     Depression     Essential hypertension 2019    Fractures 2017    Rt. clavicle fracture    High blood pressure      Patient Active Problem List    Diagnosis Date Noted    Anxiety 2019    Essential hypertension 2019      Past Surgical History:   Procedure Laterality Date    BREAST REDUCTION SURGERY  2014    CARPAL TUNNEL RELEASE Bilateral      SECTION      WISDOM TOOTH EXTRACTION        Social History     Tobacco History     Smoking Status  Former Smoker Quit date  2014 Smoking Frequency  0.5 packs/day Smoking Tobacco Type  Cigarettes    Smokeless Tobacco Use  Never Used          Alcohol History     Alcohol Use Status  No          Drug Use     Drug Use Status  No          Sexual Activity     Sexually Active  Yes Partners  Male Birth Control/Protection  Pill Comment              /72   Pulse 84   Temp 98.3 °F (36.8 °C)   Ht 5' 3\" (1.6 m)   Wt 231 lb 8 oz (105 kg)   SpO2 98%   BMI 41.01 kg/m²     EXAM:   Physical Exam  Vitals and nursing note reviewed. Constitutional:       General: She is not in acute distress.      Appearance: She is well-developed. She is obese. She is not ill-appearing. HENT:      Head: Normocephalic and atraumatic. Right Ear: Tympanic membrane normal.      Left Ear: Tympanic membrane normal.      Nose: Nose normal.      Mouth/Throat:      Mouth: Mucous membranes are moist.   Eyes:      Pupils: Pupils are equal, round, and reactive to light. Cardiovascular:      Rate and Rhythm: Normal rate and regular rhythm. Pulmonary:      Effort: Pulmonary effort is normal.      Breath sounds: Normal breath sounds. Abdominal:      General: Bowel sounds are normal.      Palpations: Abdomen is soft. Musculoskeletal:      Cervical back: Normal range of motion. Skin:     General: Skin is warm and dry. Neurological:      Mental Status: She is alert and oriented to person, place, and time. Mental status is at baseline. Psychiatric:         Mood and Affect: Mood normal.         Thought Content: Thought content normal.          Fozia was seen today for anxiety and depression. Diagnoses and all orders for this visit:    Essential hypertension  Comments:  stable  good control  no changes  continue meds    Orders:  -     losartan (COZAAR) 25 MG tablet; TAKE 1 TABLET BY MOUTH EVERY DAY  -     CBC with Auto Differential; Future  -     Comprehensive Metabolic Panel; Future    Anxiety  Comments:  stable  continue sertraline 50mg daily  seems to work at this dose  Orders:  -     sertraline (ZOLOFT) 50 MG tablet; Take 1 tablet by mouth daily    PMS (premenstrual syndrome)  Comments:  anxiety gets much worse the week before period  she is concerned it is hormonal and see DR Donavon Sarkar today in [place]      Mixed hyperlipidemia  Comments:  continue to watch diet  labs in 6mos  Orders:  -     Lipid Panel; Future        I independently reviewed and updated the chief complaint, HPI, past medical and surgical history, medications, allergies and ROS as entered by the LPN. Seen by:   Evelin Trejo,

## 2022-08-17 ENCOUNTER — OFFICE VISIT (OUTPATIENT)
Dept: PODIATRY | Age: 42
End: 2022-08-17
Payer: COMMERCIAL

## 2022-08-17 VITALS
TEMPERATURE: 97.4 F | DIASTOLIC BLOOD PRESSURE: 74 MMHG | BODY MASS INDEX: 40.92 KG/M2 | SYSTOLIC BLOOD PRESSURE: 123 MMHG | WEIGHT: 231 LBS

## 2022-08-17 DIAGNOSIS — M77.32 CALCANEAL SPUR OF BOTH FEET: ICD-10-CM

## 2022-08-17 DIAGNOSIS — M72.2 PLANTAR FASCIAL FIBROMATOSIS: Primary | ICD-10-CM

## 2022-08-17 DIAGNOSIS — M79.675 PAIN IN LEFT TOE(S): ICD-10-CM

## 2022-08-17 DIAGNOSIS — M77.31 CALCANEAL SPUR OF BOTH FEET: ICD-10-CM

## 2022-08-17 PROCEDURE — 1036F TOBACCO NON-USER: CPT | Performed by: PODIATRIST

## 2022-08-17 PROCEDURE — 99203 OFFICE O/P NEW LOW 30 MIN: CPT | Performed by: PODIATRIST

## 2022-08-17 PROCEDURE — G8427 DOCREV CUR MEDS BY ELIG CLIN: HCPCS | Performed by: PODIATRIST

## 2022-08-17 PROCEDURE — 20550 NJX 1 TENDON SHEATH/LIGAMENT: CPT | Performed by: PODIATRIST

## 2022-08-17 PROCEDURE — G8417 CALC BMI ABV UP PARAM F/U: HCPCS | Performed by: PODIATRIST

## 2022-08-17 RX ORDER — BETAMETHASONE SODIUM PHOSPHATE AND BETAMETHASONE ACETATE 3; 3 MG/ML; MG/ML
4 INJECTION, SUSPENSION INTRA-ARTICULAR; INTRALESIONAL; INTRAMUSCULAR; SOFT TISSUE ONCE
Status: COMPLETED | OUTPATIENT
Start: 2022-08-17 | End: 2022-08-17

## 2022-08-17 RX ORDER — BUPIVACAINE HYDROCHLORIDE 5 MG/ML
1 INJECTION, SOLUTION PERINEURAL ONCE
Status: COMPLETED | OUTPATIENT
Start: 2022-08-17 | End: 2022-08-17

## 2022-08-17 RX ORDER — NAPROXEN 500 MG/1
500 TABLET ORAL 2 TIMES DAILY WITH MEALS
Qty: 60 TABLET | Refills: 5 | Status: SHIPPED | OUTPATIENT
Start: 2022-08-17

## 2022-08-17 RX ADMIN — BETAMETHASONE SODIUM PHOSPHATE AND BETAMETHASONE ACETATE 4 MG: 3; 3 INJECTION, SUSPENSION INTRA-ARTICULAR; INTRALESIONAL; INTRAMUSCULAR; SOFT TISSUE at 10:17

## 2022-08-17 RX ADMIN — BUPIVACAINE HYDROCHLORIDE 5 MG: 5 INJECTION, SOLUTION PERINEURAL at 10:18

## 2022-08-17 NOTE — PROGRESS NOTES
[address]  [address] [place] [address]  Dept: [phone]  Dept Fax: [phone]  Loc: [phone]    NEW PATIENT PROGRESS NOTE  Date of patient's visit: 8/17/2022  Patient's Name:  Maryan Mandujano YOB: 1980            Patient Care Team:  Hyacinth Davalos DO as PCP - General (Family Medicine)  Hyacinth Davalos DO as PCP - [facility]        Chief Complaint   Patient presents with    New Patient    Referral - General    Foot Pain     LEFT HEEL PAIN   PT REFERRED HERSELF  REFERRED HERSELF SAW HER FAMILY MEMBERS       HPI  HPI:   Maryan Mandujano is a 38 y.o. female who presents to the office today complaining of left heel pain 3 to 6 months no trauma morning pain pain after sitting pain on the bottom of the heel. Symptoms began 3 month(s) ago. Patient relates pain is Present. Pain is rated 7 out of 10 and is described as waxing and waning. Treatments prior to today's visit incl. Currently denies F/C/N/V. Pt's primary care physician is Hyacinth Davalos DO     Allergies   Allergen Reactions    Penicillins Nausea Only       Past Medical History:   Diagnosis Date    Anxiety     Depression     Essential hypertension 11/7/2019    Fractures 02/19/2017    Rt. clavicle fracture    High blood pressure        Prior to Admission medications    Medication Sig Start Date End Date Taking?  Authorizing Provider   naproxen (NAPROSYN) 500 MG tablet Take 1 tablet by mouth 2 times daily (with meals) 8/17/22  Yes Estefanía Spotted, DPM   sertraline (ZOLOFT) 50 MG tablet Take 1 tablet by mouth daily 5/12/22  Yes Hyacinth Davalos DO   losartan (COZAAR) 25 MG tablet TAKE 1 TABLET BY MOUTH EVERY DAY 5/12/22  Yes Hyacinth Davalos DO   loratadine (CLARITIN) 10 MG tablet Take 10 mg by mouth every other day   Yes Historical Provider, MD       Past Surgical History:   Procedure Laterality Date    BREAST REDUCTION SURGERY  2014    CARPAL TUNNEL RELEASE Bilateral      SECTION      WISDOM TOOTH EXTRACTION         Family History   Problem Relation Age of Onset    High Blood Pressure Mother        Social History     Tobacco Use    Smoking status: Former     Packs/day: 0.50     Types: Cigarettes     Quit date: 2014     Years since quittin.0    Smokeless tobacco: Never   Substance Use Topics    Alcohol use: No       Review of Systems    Review of Systems:   History obtained from chart review and the patient  General ROS: negative for - chills, fatigue, fever, night sweats or weight gain  Constitutional: Negative for chills, diaphoresis, fatigue, fever and unexpected weight change. Musculoskeletal: Positive for . Neurological ROS: negative for - behavioral changes, confusion, headaches or seizures. Negative for weakness and numbness. Dermatological ROS: negative for - mole changes, rash  Cardiovascular: Negative for leg swelling. Gastrointestinal: Negative for constipation, diarrhea, nausea and vomiting. Lower Extremity Physical Examination:   Vitals:   Vitals:    22 0939   BP: 123/74   Temp: 97.4 °F (36.3 °C)        Foot Exam    General  General Appearance: appears stated age and healthy   Orientation: alert and oriented to person, place, and time       Left Foot/Ankle      Inspection and Palpation  Tenderness: bony tenderness, plantar fascia and calcaneus tenderness   Swelling: plantar fascia   Skin Exam: skin intact;      Neurovascular  Dorsalis pedis: 3+  Posterior tibial: 3+  Saphenous nerve sensation: normal  Tibial nerve sensation: normal  Superficial peroneal nerve sensation: normal  Deep peroneal nerve sensation: normal  Sural nerve sensation: normal  Achilles reflex: 2+  Babinski reflex: 2+    Muscle Strength  Ankle dorsiflexion: 5  Ankle plantar flexion: 5  Ankle inversion: 5  Ankle eversion: 5  Great toe extension: 5  Great toe flexion: 5    Range of Motion    Normal left ankle ROM      Left Ankle Exam     Range of Motion   The patient has normal left ankle ROM. Muscle Strength   The patient has normal left ankle strength. Dorsiflexion:  5/5   Plantar flexion:  5/5           General: AAO x 3 in NAD. Dermatologic Exam:  Skin lesion/ulceration Absent . Skin No rashes or nodules noted. , color normal.   Musculoskeletal:     1st MPJ ROM within normal limits, Bilateral.    Ankle ROM within normal limits,Left. HEEL PAIN PAIN WITH PALPATION INFERIOR ASPECT OF LEFT HEEL PAIN WITH PALPATION AT THE INSERTION OF THE PLANTAR FASCIAL MUSCLE CALCANEUS MEDIAL BAND     TARSAL TUNNEL PAIN negative  Vascular:     Radiographs:  3 views x-rays of the left foot show an inferior calcaneal spur foot/ankle:     Asessment: Patient is a 38 y.o. female with:    Diagnosis Orders   1. Plantar fascial fibromatosis  XR FOOT LEFT (MIN 3 VIEWS)      2. Calcaneal spur of both feet        3. Pain in left toe(s)            Plan: Patient examined and evaluated. Current condition and treatment options discussed in detail. Discussed conservative and surgical options with the patient. Treatment options today consisted of verbal and written instructions given to patient. Contact office with any questions/problems/concerns. RTC in 3week(s). Potential risks, complications, alternative treatments, and procedure prognosis were explained to the patient. Verbal informed consent was obtained from the patient. Today I reviewed the x-ray treatment options. Chlora prep preparation was performed over plantar fascia. 1 mL of 0.5% Marcaine plain and 1cc celestone Soluspan   injected in standard medial approach plantar fascia. Patient tolerated steroid injection well. Band-Aid applied. The patient was educated on a possible steroid flare and the use of ice with frozen water bottle 10 minutes each night discussed. LEFT   The patient was dispensed a night splint.  It is medically necessary and within the standard of care

## 2022-10-11 ENCOUNTER — TELEPHONE (OUTPATIENT)
Dept: FAMILY MEDICINE CLINIC | Age: 42
End: 2022-10-11

## 2022-10-11 DIAGNOSIS — F41.9 ANXIETY: ICD-10-CM

## 2022-10-11 NOTE — TELEPHONE ENCOUNTER
Fozia calling in she needs a refill of her Sertraline 50 Mg to be sent to [facility] in [facility]. She is completley out and her next appointment is November 14th.

## 2022-11-14 ENCOUNTER — OFFICE VISIT (OUTPATIENT)
Dept: PODIATRY | Age: 42
End: 2022-11-14
Payer: COMMERCIAL

## 2022-11-14 ENCOUNTER — OFFICE VISIT (OUTPATIENT)
Dept: FAMILY MEDICINE CLINIC | Age: 42
End: 2022-11-14
Payer: COMMERCIAL

## 2022-11-14 VITALS
WEIGHT: 227.8 LBS | HEART RATE: 90 BPM | OXYGEN SATURATION: 98 % | SYSTOLIC BLOOD PRESSURE: 118 MMHG | BODY MASS INDEX: 40.36 KG/M2 | TEMPERATURE: 98.2 F | HEIGHT: 63 IN | DIASTOLIC BLOOD PRESSURE: 80 MMHG

## 2022-11-14 VITALS
SYSTOLIC BLOOD PRESSURE: 118 MMHG | WEIGHT: 227 LBS | DIASTOLIC BLOOD PRESSURE: 80 MMHG | BODY MASS INDEX: 40.21 KG/M2 | TEMPERATURE: 98.2 F

## 2022-11-14 DIAGNOSIS — M77.32 CALCANEAL SPUR OF BOTH FEET: ICD-10-CM

## 2022-11-14 DIAGNOSIS — M72.2 PLANTAR FASCIAL FIBROMATOSIS: Primary | ICD-10-CM

## 2022-11-14 DIAGNOSIS — M79.674 PAIN IN TOE OF RIGHT FOOT: ICD-10-CM

## 2022-11-14 DIAGNOSIS — F41.9 ANXIETY: ICD-10-CM

## 2022-11-14 DIAGNOSIS — I10 ESSENTIAL HYPERTENSION: Primary | ICD-10-CM

## 2022-11-14 DIAGNOSIS — R73.01 IMPAIRED FASTING BLOOD SUGAR: ICD-10-CM

## 2022-11-14 DIAGNOSIS — E07.9 THYROID DISEASE: ICD-10-CM

## 2022-11-14 DIAGNOSIS — E78.2 MIXED HYPERLIPIDEMIA: ICD-10-CM

## 2022-11-14 DIAGNOSIS — M77.31 CALCANEAL SPUR OF BOTH FEET: ICD-10-CM

## 2022-11-14 PROCEDURE — G8427 DOCREV CUR MEDS BY ELIG CLIN: HCPCS | Performed by: FAMILY MEDICINE

## 2022-11-14 PROCEDURE — 1036F TOBACCO NON-USER: CPT | Performed by: PODIATRIST

## 2022-11-14 PROCEDURE — G8417 CALC BMI ABV UP PARAM F/U: HCPCS | Performed by: PODIATRIST

## 2022-11-14 PROCEDURE — G8427 DOCREV CUR MEDS BY ELIG CLIN: HCPCS | Performed by: PODIATRIST

## 2022-11-14 PROCEDURE — G8417 CALC BMI ABV UP PARAM F/U: HCPCS | Performed by: FAMILY MEDICINE

## 2022-11-14 PROCEDURE — 3078F DIAST BP <80 MM HG: CPT | Performed by: PODIATRIST

## 2022-11-14 PROCEDURE — 3074F SYST BP LT 130 MM HG: CPT | Performed by: FAMILY MEDICINE

## 2022-11-14 PROCEDURE — G8484 FLU IMMUNIZE NO ADMIN: HCPCS | Performed by: PODIATRIST

## 2022-11-14 PROCEDURE — G8484 FLU IMMUNIZE NO ADMIN: HCPCS | Performed by: FAMILY MEDICINE

## 2022-11-14 PROCEDURE — 99214 OFFICE O/P EST MOD 30 MIN: CPT | Performed by: FAMILY MEDICINE

## 2022-11-14 PROCEDURE — 3078F DIAST BP <80 MM HG: CPT | Performed by: FAMILY MEDICINE

## 2022-11-14 PROCEDURE — 1036F TOBACCO NON-USER: CPT | Performed by: FAMILY MEDICINE

## 2022-11-14 PROCEDURE — 3074F SYST BP LT 130 MM HG: CPT | Performed by: PODIATRIST

## 2022-11-14 PROCEDURE — 99213 OFFICE O/P EST LOW 20 MIN: CPT | Performed by: PODIATRIST

## 2022-11-14 RX ORDER — LOSARTAN POTASSIUM 25 MG/1
TABLET ORAL
Qty: 90 TABLET | Refills: 1 | Status: SHIPPED | OUTPATIENT
Start: 2022-11-14

## 2022-11-14 RX ORDER — ETODOLAC 400 MG/1
400 TABLET, FILM COATED ORAL 2 TIMES DAILY
Qty: 180 TABLET | Refills: 1 | Status: SHIPPED | OUTPATIENT
Start: 2022-11-14 | End: 2023-05-13

## 2022-11-14 ASSESSMENT — ENCOUNTER SYMPTOMS
DIARRHEA: 0
VOMITING: 0
ABDOMINAL PAIN: 0
TROUBLE SWALLOWING: 0
SORE THROAT: 0
EYE PAIN: 0
SHORTNESS OF BREATH: 0
NAUSEA: 0
BACK PAIN: 0
SINUS PAIN: 0
WHEEZING: 0
COUGH: 0
CHEST TIGHTNESS: 0
CONSTIPATION: 0

## 2022-11-14 NOTE — PROGRESS NOTES
22  Fozia Naidu : 1980 Sex: female  Age: 38 y.o. Patient was referred by Mariano Kim DO    Chief Complaint   Patient presents with    Foot Pain     Pt was last seen in Aug 2022 for left PF presents today for right heel pain        SUBJECTIVE patient is seen today for new issue right heel pain. 6 months no trauma noted pain on the bottom of the heel pain when standing. Foot Pain     Review of Systems  Const: Denies constitutional symptoms  Musculo: Denies symptoms other than stated above  Skin: Denies symptoms other than stated above       Current Outpatient Medications:     losartan (COZAAR) 25 MG tablet, TAKE 1 TABLET BY MOUTH EVERY DAY, Disp: 90 tablet, Rfl: 1    sertraline (ZOLOFT) 50 MG tablet, Take 1.5 tablets by mouth daily, Disp: 135 tablet, Rfl: 1    etodolac (LODINE) 400 MG tablet, Take 1 tablet by mouth 2 times daily, Disp: 180 tablet, Rfl: 1    loratadine (CLARITIN) 10 MG tablet, Take 10 mg by mouth every other day, Disp: , Rfl:   Allergies   Allergen Reactions    Penicillins Nausea Only       Past Medical History:   Diagnosis Date    Anxiety     Depression     Essential hypertension 2019    Fractures 2017    Rt.  clavicle fracture    High blood pressure      Past Surgical History:   Procedure Laterality Date    BREAST REDUCTION SURGERY  2014    CARPAL TUNNEL RELEASE Bilateral      SECTION      WISDOM TOOTH EXTRACTION       Family History   Problem Relation Age of Onset    High Blood Pressure Mother      Social History     Socioeconomic History    Marital status:      Spouse name: Not on file    Number of children: Not on file    Years of education: Not on file    Highest education level: Not on file   Occupational History    Not on file   Tobacco Use    Smoking status: Former     Packs/day: 0.50     Types: Cigarettes     Quit date: 2014     Years since quittin.2    Smokeless tobacco: Never   Substance and Sexual Activity    Alcohol use: No    Drug use: No    Sexual activity: Yes     Partners: Male     Birth control/protection: Pill     Comment:    Other Topics Concern    Not on file   Social History Narrative    Not on file     Social Determinants of Health     Financial Resource Strain: Not on file   Food Insecurity: Not on file   Transportation Needs: Not on file   Physical Activity: Not on file   Stress: Not on file   Social Connections: Not on file   Intimate Partner Violence: Not on file   Housing Stability: Not on file       Vitals:    11/14/22 1110   BP: 118/80   Temp: 98.2 °F (36.8 °C)   TempSrc: Temporal   Weight: 227 lb (103 kg)       Focused Lower Extremity Physical Exam:  Vitals:    11/14/22 1110   BP: 118/80   Temp: 98.2 °F (36.8 °C)        Foot Exam    General  General Appearance: appears stated age and healthy   Orientation: alert and oriented to person, place, and time       Right Foot/Ankle     Inspection and Palpation  Tenderness: calcaneus tenderness, bony tenderness and plantar fascia   Skin Exam: skin intact; Neurovascular  Dorsalis pedis: 3+  Posterior tibial: 3+  Saphenous nerve sensation: normal  Tibial nerve sensation: normal  Superficial peroneal nerve sensation: normal  Deep peroneal nerve sensation: normal  Sural nerve sensation: normal  Achilles reflex: 2+  Babinski reflex: 2+    Muscle Strength  Ankle dorsiflexion: 5  Ankle plantar flexion: 5  Ankle inversion: 5  Ankle eversion: 5  Great toe extension: 5  Great toe flexion: 5    Range of Motion    Normal right ankle ROM           Right Ankle Exam     Range of Motion   The patient has normal right ankle ROM.     Muscle Strength   Dorsiflexion:  5/5  Plantar flexion:  5/5           Vascular: pulses  dp  pt    Capillary Refill Time:   Hair growth  Skin:    Edema:    Neurologic:      Musculoskeletal/ Orthopedic examination: Pain with palpation to the inferior aspect of the right heel pain with palpation at the insertion of the plantar fascial muscle calcaneus  NAIL  Web space   Derm: X-rays were taken showing a small inferior calcaneal spur        Assessment and Plan: Today the patient was placed on [place] x-rays were reviewed. Icing stretching if no improvement injection power step orthotics were ordered. Fozia was seen today for foot pain. Diagnoses and all orders for this visit:    Plantar fascial fibromatosis  -     XR FOOT RIGHT (MIN 3 VIEWS); Future    Calcaneal spur of both feet    Pain in toe of right foot    Other orders  -     etodolac (LODINE) 400 MG tablet; Take 1 tablet by mouth 2 times daily      No follow-ups on file. Seen By:  Fly Clarke DPM      Document was created using voice recognition software. Note was reviewed, however may contain grammatical errors.

## 2022-11-14 NOTE — PROGRESS NOTES
11/14/22    Name: Sandra Knapp  SFV:[birth year]   Sex:female   Age:42 y.o. Chief Complaint   Patient presents with    Hypertension    Anxiety    Menstrual Problem     Patient presents to office for visit. She thinks the Zoloft helps most days. Patient says she does still have issues right before she starts her menses. The week before her menses she doesn't feel right, she is extremely fatigued, feels as if she is coming down with something and then is fine once her period starts. She has been having a lot of headaches recently. Patient did see gyn and was advised to take multi vitamin and b complex vitamin, which she did for a time. Patient says the only thing she noticed is that she had slightly more energy. She is trying to drink more water and less pop. Patient says gyn wanted her to have mammo and patient doesn't think she really needs to have one. Patient is having pain in her right heel again and says she needs to schedule with podiatry. 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    HENT:  Negative for congestion, ear pain, sinus pain, sore throat and trouble swallowing. Eyes:  Negative for pain. Respiratory:  Negative for cough, chest tightness, shortness of breath and wheezing. Cardiovascular:  Negative for chest pain, palpitations and leg swelling. Gastrointestinal:  Negative for abdominal pain, constipation, diarrhea, nausea and vomiting. Endocrine: Negative for cold intolerance and heat intolerance. Genitourinary:  Negative for difficulty urinating, hematuria and pelvic pain. Musculoskeletal:  Negative for arthralgias, back pain, gait problem, joint swelling and myalgias. Skin:  Negative for rash and wound. Neurological:  Negative for dizziness, syncope and headaches. Hematological:  Negative for adenopathy. Psychiatric/Behavioral:  Negative for confusion, self-injury, sleep disturbance and suicidal ideas. Current Outpatient Medications:     losartan (COZAAR) 25 MG tablet, TAKE 1 TABLET BY MOUTH EVERY DAY, Disp: 90 tablet, Rfl: 1    sertraline (ZOLOFT) 50 MG tablet, Take 1.5 tablets by mouth daily, Disp: 135 tablet, Rfl: 1    etodolac (LODINE) 400 MG tablet, Take 1 tablet by mouth 2 times daily, Disp: 180 tablet, Rfl: 1    loratadine (CLARITIN) 10 MG tablet, Take 10 mg by mouth every other day, Disp: , Rfl:   Allergies   Allergen Reactions    Penicillins Nausea Only      Past Medical History:   Diagnosis Date    Anxiety     Depression     Essential hypertension 2019    Fractures 2017    Rt.  clavicle fracture    High blood pressure      Patient Active Problem List    Diagnosis Date Noted    Anxiety 2019    Essential hypertension 2019      Past Surgical History:   Procedure Laterality Date    BREAST REDUCTION SURGERY      CARPAL TUNNEL RELEASE Bilateral      SECTION      WISDOM TOOTH EXTRACTION        Social History       Tobacco History       Smoking Status  Former Quit Date  2014 Smoking Frequency  0.50 packs/day Smoking Tobacco Type  Cigarettes quit in 2014      Smokeless Tobacco Use  Never              Alcohol History       Alcohol Use Status  No              Drug Use       Drug Use Status  No              Sexual Activity       Sexually Active  Yes Partners  Male Birth Control/Protection  Pill Comment                  /80   Pulse 90   Temp 98.2 °F (36.8 °C)   Ht 5' 3\" (1.6 m)   Wt 227 lb 12.8 oz (103.3 kg)   LMP 11/01/2022 (Approximate)   SpO2 98%   BMI 40.35 kg/m²     EXAM:   Physical Exam  Vitals and nursing note reviewed. Constitutional:       General: She is not in acute distress. Appearance: She is well-developed. She is not toxic-appearing. HENT:      Head: Normocephalic and atraumatic. Right Ear: Tympanic membrane normal.      Left Ear: Tympanic membrane normal.      Nose: No congestion. Eyes:      Pupils: Pupils are equal, round, and reactive to light. Cardiovascular:      Rate and Rhythm: Normal rate and regular rhythm. Pulmonary:      Effort: Pulmonary effort is normal.      Breath sounds: Normal breath sounds. Abdominal:      General: Bowel sounds are normal.      Palpations: Abdomen is soft. Musculoskeletal:      Cervical back: Normal range of motion. Skin:     General: Skin is warm and dry. Neurological:      Mental Status: She is alert and oriented to person, place, and time. Psychiatric:         Mood and Affect: Mood normal.         Thought Content: Thought content normal.        Fozia was seen today for hypertension, anxiety and menstrual problem. Diagnoses and all orders for this visit:    Essential hypertension  Comments:  stable  good control  no changes  continue meds    Orders:  -     losartan (COZAAR) 25 MG tablet; TAKE 1 TABLET BY MOUTH EVERY DAY  -     CBC with Auto Differential; Future  -     Comprehensive Metabolic Panel;  Future  -     Microalbumin, Ur; Future    Anxiety  Comments:  stable  continue sertraline 50mg daily  but does take 75mg as needed will change rx to reflect this  seems to work at this dose  Orders:  -     Discontinue: sertraline (ZOLOFT) 50 MG tablet; Take 1 tablet by mouth daily  -     sertraline (ZOLOFT) 50 MG tablet; Take 1.5 tablets by mouth daily    Mixed hyperlipidemia  Comments:  due for labs  will check them this week  counseled about diet  Orders:  -     Lipid Panel; Future    Thyroid disease  Comments:  check labs it has been a year  still mood swings johanna around menses  Orders:  -     T4, Free; Future  -     TSH; Future    Impaired fasting blood sugar  Comments:  counseled about diet and increased movement'  check labs, his of prediabetes  Orders:  -     Hemoglobin A1C; Future      I independently reviewed and updated the chief complaint, HPI, past medical and surgical history, medications, allergies and ROS as entered by the LPN. Seen by:   Sofiya Fletcher DO

## 2023-03-22 ENCOUNTER — OFFICE VISIT (OUTPATIENT)
Dept: FAMILY MEDICINE CLINIC | Age: 43
End: 2023-03-22

## 2023-03-22 VITALS
TEMPERATURE: 98.1 F | HEIGHT: 63 IN | WEIGHT: 231 LBS | HEART RATE: 90 BPM | BODY MASS INDEX: 40.93 KG/M2 | SYSTOLIC BLOOD PRESSURE: 118 MMHG | DIASTOLIC BLOOD PRESSURE: 80 MMHG | OXYGEN SATURATION: 97 %

## 2023-03-22 DIAGNOSIS — I10 ESSENTIAL HYPERTENSION: ICD-10-CM

## 2023-03-22 DIAGNOSIS — E78.2 MIXED HYPERLIPIDEMIA: ICD-10-CM

## 2023-03-22 DIAGNOSIS — R73.01 IMPAIRED FASTING BLOOD SUGAR: ICD-10-CM

## 2023-03-22 DIAGNOSIS — I10 PRIMARY HYPERTENSION: ICD-10-CM

## 2023-03-22 DIAGNOSIS — F41.9 ANXIETY: ICD-10-CM

## 2023-03-22 DIAGNOSIS — N91.2 AMENORRHEA: ICD-10-CM

## 2023-03-22 DIAGNOSIS — O09.91 HIGH-RISK PREGNANCY IN FIRST TRIMESTER: Primary | ICD-10-CM

## 2023-03-22 DIAGNOSIS — E07.9 THYROID DISEASE: ICD-10-CM

## 2023-03-22 LAB
ALBUMIN SERPL-MCNC: 3.6 G/DL (ref 3.5–5.2)
ALP SERPL-CCNC: 72 U/L (ref 35–104)
ALT SERPL-CCNC: 13 U/L (ref 0–32)
ANION GAP SERPL CALCULATED.3IONS-SCNC: 13 MMOL/L (ref 7–16)
AST SERPL-CCNC: 15 U/L (ref 0–31)
BASOPHILS # BLD: 0.06 E9/L (ref 0–0.2)
BASOPHILS NFR BLD: 0.8 % (ref 0–2)
BILIRUB SERPL-MCNC: 0.4 MG/DL (ref 0–1.2)
BUN SERPL-MCNC: 15 MG/DL (ref 6–20)
CALCIUM SERPL-MCNC: 8.8 MG/DL (ref 8.6–10.2)
CHLORIDE SERPL-SCNC: 104 MMOL/L (ref 98–107)
CHOLESTEROL, TOTAL: 168 MG/DL (ref 0–199)
CO2 SERPL-SCNC: 20 MMOL/L (ref 22–29)
CREAT SERPL-MCNC: 0.6 MG/DL (ref 0.5–1)
EOSINOPHIL # BLD: 0.1 E9/L (ref 0.05–0.5)
EOSINOPHIL NFR BLD: 1.3 % (ref 0–6)
ERYTHROCYTE [DISTWIDTH] IN BLOOD BY AUTOMATED COUNT: 14.1 FL (ref 11.5–15)
GLUCOSE SERPL-MCNC: 80 MG/DL (ref 74–99)
GONADOTROPIN, CHORIONIC (HCG) QUANT: ABNORMAL MIU/ML
HBA1C MFR BLD: 5.2 % (ref 4–5.6)
HCT VFR BLD AUTO: 39.2 % (ref 34–48)
HDLC SERPL-MCNC: 62 MG/DL
HGB BLD-MCNC: 12.7 G/DL (ref 11.5–15.5)
IMM GRANULOCYTES # BLD: 0.02 E9/L
IMM GRANULOCYTES NFR BLD: 0.3 % (ref 0–5)
LDLC SERPL CALC-MCNC: 91 MG/DL (ref 0–99)
LYMPHOCYTES # BLD: 2.7 E9/L (ref 1.5–4)
LYMPHOCYTES NFR BLD: 34.8 % (ref 20–42)
MCH RBC QN AUTO: 30 PG (ref 26–35)
MCHC RBC AUTO-ENTMCNC: 32.4 % (ref 32–34.5)
MCV RBC AUTO: 92.7 FL (ref 80–99.9)
MICROALBUMIN UR-MCNC: <12 MG/L
MONOCYTES # BLD: 0.47 E9/L (ref 0.1–0.95)
MONOCYTES NFR BLD: 6.1 % (ref 2–12)
NEUTROPHILS # BLD: 4.4 E9/L (ref 1.8–7.3)
NEUTS SEG NFR BLD: 56.7 % (ref 43–80)
PLATELET # BLD AUTO: 254 E9/L (ref 130–450)
PMV BLD AUTO: 10.2 FL (ref 7–12)
POTASSIUM SERPL-SCNC: 4.5 MMOL/L (ref 3.5–5)
PROT SERPL-MCNC: 6.8 G/DL (ref 6.4–8.3)
RBC # BLD AUTO: 4.23 E12/L (ref 3.5–5.5)
SODIUM SERPL-SCNC: 137 MMOL/L (ref 132–146)
T4 FREE SERPL-MCNC: 1.02 NG/DL (ref 0.93–1.7)
TRIGL SERPL-MCNC: 77 MG/DL (ref 0–149)
TSH SERPL-MCNC: 3.39 UIU/ML (ref 0.27–4.2)
VLDLC SERPL CALC-MCNC: 15 MG/DL
WBC # BLD: 7.8 E9/L (ref 4.5–11.5)

## 2023-03-22 RX ORDER — LABETALOL 100 MG/1
100 TABLET, FILM COATED ORAL 2 TIMES DAILY
Qty: 60 TABLET | Refills: 3 | Status: SHIPPED | OUTPATIENT
Start: 2023-03-22

## 2023-03-22 ASSESSMENT — ENCOUNTER SYMPTOMS
DIARRHEA: 0
SHORTNESS OF BREATH: 0
EYE PAIN: 0
NAUSEA: 1
CHEST TIGHTNESS: 0
TROUBLE SWALLOWING: 0
CONSTIPATION: 0
WHEEZING: 0
SINUS PAIN: 0
VOMITING: 0
COUGH: 0
BACK PAIN: 0
SORE THROAT: 0
ABDOMINAL PAIN: 0

## 2023-03-22 ASSESSMENT — PATIENT HEALTH QUESTIONNAIRE - PHQ9
SUM OF ALL RESPONSES TO PHQ QUESTIONS 1-9: 2
SUM OF ALL RESPONSES TO PHQ QUESTIONS 1-9: 2
10. IF YOU CHECKED OFF ANY PROBLEMS, HOW DIFFICULT HAVE THESE PROBLEMS MADE IT FOR YOU TO DO YOUR WORK, TAKE CARE OF THINGS AT HOME, OR GET ALONG WITH OTHER PEOPLE: 0
SUM OF ALL RESPONSES TO PHQ QUESTIONS 1-9: 2
SUM OF ALL RESPONSES TO PHQ QUESTIONS 1-9: 2
7. TROUBLE CONCENTRATING ON THINGS, SUCH AS READING THE NEWSPAPER OR WATCHING TELEVISION: 0
3. TROUBLE FALLING OR STAYING ASLEEP: 0
4. FEELING TIRED OR HAVING LITTLE ENERGY: 2
8. MOVING OR SPEAKING SO SLOWLY THAT OTHER PEOPLE COULD HAVE NOTICED. OR THE OPPOSITE, BEING SO FIGETY OR RESTLESS THAT YOU HAVE BEEN MOVING AROUND A LOT MORE THAN USUAL: 0
6. FEELING BAD ABOUT YOURSELF - OR THAT YOU ARE A FAILURE OR HAVE LET YOURSELF OR YOUR FAMILY DOWN: 0
2. FEELING DOWN, DEPRESSED OR HOPELESS: 0
5. POOR APPETITE OR OVEREATING: 0
SUM OF ALL RESPONSES TO PHQ9 QUESTIONS 1 & 2: 0
1. LITTLE INTEREST OR PLEASURE IN DOING THINGS: 0
9. THOUGHTS THAT YOU WOULD BE BETTER OFF DEAD, OR OF HURTING YOURSELF: 0

## 2023-03-22 NOTE — PROGRESS NOTES
Normocephalic and atraumatic. Eyes:      Pupils: Pupils are equal, round, and reactive to light. Cardiovascular:      Rate and Rhythm: Normal rate and regular rhythm. Pulmonary:      Effort: Pulmonary effort is normal. No respiratory distress. Breath sounds: No wheezing or rhonchi. Musculoskeletal:      Cervical back: Normal range of motion. Skin:     General: Skin is warm and dry. Neurological:      Mental Status: She is alert and oriented to person, place, and time. Psychiatric:         Mood and Affect: Mood normal.         Thought Content: Thought content normal.        Fozia was seen today for hypertension, depression and pregnancy test.    Diagnoses and all orders for this visit:    High-risk pregnancy in first trimester  Comments:  advanced maternal age and hypertension make her high risk'also on sertraline but not stopping that  labs were done today   referral placed  Orders:  -     John Trinh MD, OB/GYN, [place] ([facility])    Amenorrhea  Comments:  urine preg test at home positive  get serum test today    Orders:  -     HCG, QUANTITATIVE, PREGNANCY; Future  -     John Trinh MD, OB/GYN, [place] ([facility])    Primary hypertension  Comments:  stop losartan now  switch to labetolol  check bp  over the weekend with nothing then call monday will start labetolol  Orders:  -     labetalol (NORMODYNE) 100 MG tablet; Take 1 tablet by mouth 2 times daily    Anxiety  Comments:  recommend she stay on the sertraline  would not stop at thsi point'  she is already really stressed out and have more issues      I independently reviewed and updated the chief complaint, HPI, past medical and surgical history, medications, allergies and ROS as entered by the LPN. Seen by:   Deon Hernandez, DO

## 2023-06-17 DIAGNOSIS — F41.9 ANXIETY: ICD-10-CM

## 2023-06-17 RX ORDER — LOSARTAN POTASSIUM 25 MG/1
TABLET ORAL
Qty: 90 TABLET | Refills: 1 | OUTPATIENT
Start: 2023-06-17

## 2023-07-02 DIAGNOSIS — F41.9 ANXIETY: ICD-10-CM

## 2023-07-03 RX ORDER — LOSARTAN POTASSIUM 25 MG/1
TABLET ORAL
Qty: 90 TABLET | Refills: 1 | OUTPATIENT
Start: 2023-07-03

## 2023-07-06 ENCOUNTER — OFFICE VISIT (OUTPATIENT)
Dept: FAMILY MEDICINE CLINIC | Age: 43
End: 2023-07-06
Payer: COMMERCIAL

## 2023-07-06 VITALS
OXYGEN SATURATION: 97 % | TEMPERATURE: 98.2 F | HEART RATE: 96 BPM | SYSTOLIC BLOOD PRESSURE: 134 MMHG | DIASTOLIC BLOOD PRESSURE: 88 MMHG | HEIGHT: 63 IN | BODY MASS INDEX: 40.26 KG/M2 | WEIGHT: 227.2 LBS

## 2023-07-06 DIAGNOSIS — Z76.89 ENCOUNTER FOR WEIGHT MANAGEMENT: ICD-10-CM

## 2023-07-06 DIAGNOSIS — Z00.00 ENCOUNTER FOR WELL ADULT EXAM WITHOUT ABNORMAL FINDINGS: Primary | ICD-10-CM

## 2023-07-06 DIAGNOSIS — F41.9 ANXIETY: ICD-10-CM

## 2023-07-06 DIAGNOSIS — I10 ESSENTIAL HYPERTENSION: ICD-10-CM

## 2023-07-06 DIAGNOSIS — R73.03 PREDIABETES: ICD-10-CM

## 2023-07-06 PROCEDURE — 93000 ELECTROCARDIOGRAM COMPLETE: CPT | Performed by: FAMILY MEDICINE

## 2023-07-06 PROCEDURE — 3075F SYST BP GE 130 - 139MM HG: CPT | Performed by: FAMILY MEDICINE

## 2023-07-06 PROCEDURE — 3079F DIAST BP 80-89 MM HG: CPT | Performed by: FAMILY MEDICINE

## 2023-07-06 PROCEDURE — 99396 PREV VISIT EST AGE 40-64: CPT | Performed by: FAMILY MEDICINE

## 2023-07-06 RX ORDER — LOSARTAN POTASSIUM 50 MG/1
TABLET ORAL
Qty: 90 TABLET | Refills: 1 | Status: SHIPPED | OUTPATIENT
Start: 2023-07-06

## 2023-07-06 ASSESSMENT — ANXIETY QUESTIONNAIRES
GAD7 TOTAL SCORE: 16
3. WORRYING TOO MUCH ABOUT DIFFERENT THINGS: 3
IF YOU CHECKED OFF ANY PROBLEMS ON THIS QUESTIONNAIRE, HOW DIFFICULT HAVE THESE PROBLEMS MADE IT FOR YOU TO DO YOUR WORK, TAKE CARE OF THINGS AT HOME, OR GET ALONG WITH OTHER PEOPLE: SOMEWHAT DIFFICULT
4. TROUBLE RELAXING: 2
7. FEELING AFRAID AS IF SOMETHING AWFUL MIGHT HAPPEN: 2
5. BEING SO RESTLESS THAT IT IS HARD TO SIT STILL: 2
2. NOT BEING ABLE TO STOP OR CONTROL WORRYING: 3
6. BECOMING EASILY ANNOYED OR IRRITABLE: 2
1. FEELING NERVOUS, ANXIOUS, OR ON EDGE: 2

## 2023-07-06 ASSESSMENT — ENCOUNTER SYMPTOMS
CHEST TIGHTNESS: 0
BACK PAIN: 0
SORE THROAT: 0
EYE PAIN: 0
VOMITING: 0
COUGH: 0
SHORTNESS OF BREATH: 0
ABDOMINAL PAIN: 0
NAUSEA: 0
DIARRHEA: 0
TROUBLE SWALLOWING: 0
WHEEZING: 0
SINUS PAIN: 0
CONSTIPATION: 0

## 2023-07-06 NOTE — PROGRESS NOTES
8/26/22 GI referral and records were faxed to  GI dept. At [phone]. Confirmation was received. Pt was at office with wife today and states he has not heard from  on appt. Spoke with Petty Garcia states they have received records and the providers are reviewing them. She was informed [place] and was hoping to get an appt set up before he left. Pt was informed of this. she does  SE advised, weekly injection  Orders:  -     Semaglutide,0.25 or 0.5MG/DOS, 2 MG/3ML SOPN; Inject 0.5 mg into the skin once a week    Encounter for weight management  Comments:  she is trying to eat better,low salt  stopped pop  smaller portions  try ozempic  Orders:  -     Semaglutide,0.25 or 0.5MG/DOS, 2 MG/3ML SOPN; Inject 0.5 mg into the skin once a week        I independently reviewed and updated the chief complaint, HPI, past medical and surgical history, medications, allergies and ROS as entered by the LPN. Seen by:   Bartolo Epley, DO

## 2023-07-19 DIAGNOSIS — I10 ESSENTIAL HYPERTENSION: ICD-10-CM

## 2023-07-19 DIAGNOSIS — E78.2 MIXED HYPERLIPIDEMIA: ICD-10-CM

## 2023-07-19 DIAGNOSIS — E66.01 CLASS 3 SEVERE OBESITY WITH SERIOUS COMORBIDITY AND BODY MASS INDEX (BMI) OF 40.0 TO 44.9 IN ADULT, UNSPECIFIED OBESITY TYPE (HCC): Primary | ICD-10-CM

## 2023-07-19 DIAGNOSIS — R73.03 PREDIABETES: ICD-10-CM

## 2023-07-19 RX ORDER — SEMAGLUTIDE 0.5 MG/.5ML
0.5 INJECTION, SOLUTION SUBCUTANEOUS
Qty: 2 ML | Refills: 5 | Status: SHIPPED | OUTPATIENT
Start: 2023-07-19

## 2023-10-05 ENCOUNTER — OFFICE VISIT (OUTPATIENT)
Dept: FAMILY MEDICINE CLINIC | Age: 43
End: 2023-10-05
Payer: COMMERCIAL

## 2023-10-05 VITALS
OXYGEN SATURATION: 98 % | SYSTOLIC BLOOD PRESSURE: 118 MMHG | HEIGHT: 63 IN | BODY MASS INDEX: 38.2 KG/M2 | TEMPERATURE: 98.2 F | HEART RATE: 90 BPM | WEIGHT: 215.6 LBS | DIASTOLIC BLOOD PRESSURE: 78 MMHG

## 2023-10-05 DIAGNOSIS — F41.9 ANXIETY: ICD-10-CM

## 2023-10-05 DIAGNOSIS — Z00.00 ENCOUNTER FOR WELL ADULT EXAM WITHOUT ABNORMAL FINDINGS: Primary | ICD-10-CM

## 2023-10-05 DIAGNOSIS — Z76.89 ENCOUNTER FOR WEIGHT MANAGEMENT: ICD-10-CM

## 2023-10-05 DIAGNOSIS — F32.1 CURRENT MODERATE EPISODE OF MAJOR DEPRESSIVE DISORDER WITHOUT PRIOR EPISODE (HCC): ICD-10-CM

## 2023-10-05 DIAGNOSIS — I10 ESSENTIAL HYPERTENSION: Primary | ICD-10-CM

## 2023-10-05 PROCEDURE — 3078F DIAST BP <80 MM HG: CPT | Performed by: FAMILY MEDICINE

## 2023-10-05 PROCEDURE — 99214 OFFICE O/P EST MOD 30 MIN: CPT | Performed by: FAMILY MEDICINE

## 2023-10-05 PROCEDURE — 3074F SYST BP LT 130 MM HG: CPT | Performed by: FAMILY MEDICINE

## 2023-10-05 RX ORDER — LOSARTAN POTASSIUM 50 MG/1
TABLET ORAL
Qty: 90 TABLET | Refills: 1 | Status: SHIPPED | OUTPATIENT
Start: 2023-10-05

## 2023-10-05 ASSESSMENT — ENCOUNTER SYMPTOMS
EYE PAIN: 0
BACK PAIN: 0
TROUBLE SWALLOWING: 0
DIARRHEA: 0
WHEEZING: 0
CHEST TIGHTNESS: 0
SHORTNESS OF BREATH: 0
CONSTIPATION: 0
COUGH: 0
SINUS PAIN: 0
ABDOMINAL PAIN: 0
VOMITING: 0
NAUSEA: 0
SORE THROAT: 0

## 2023-10-26 ENCOUNTER — OFFICE VISIT (OUTPATIENT)
Dept: FAMILY MEDICINE CLINIC | Age: 43
End: 2023-10-26
Payer: COMMERCIAL

## 2023-10-26 VITALS
OXYGEN SATURATION: 98 % | WEIGHT: 211.6 LBS | BODY MASS INDEX: 37.49 KG/M2 | DIASTOLIC BLOOD PRESSURE: 78 MMHG | SYSTOLIC BLOOD PRESSURE: 134 MMHG | RESPIRATION RATE: 18 BRPM | HEIGHT: 63 IN | TEMPERATURE: 98.5 F

## 2023-10-26 DIAGNOSIS — R42 DIZZINESS: ICD-10-CM

## 2023-10-26 DIAGNOSIS — R11.2 NAUSEA AND VOMITING, UNSPECIFIED VOMITING TYPE: ICD-10-CM

## 2023-10-26 DIAGNOSIS — T50.905A ADVERSE EFFECT OF DRUG, INITIAL ENCOUNTER: Primary | ICD-10-CM

## 2023-10-26 PROCEDURE — 3078F DIAST BP <80 MM HG: CPT | Performed by: PHYSICIAN ASSISTANT

## 2023-10-26 PROCEDURE — 99214 OFFICE O/P EST MOD 30 MIN: CPT | Performed by: PHYSICIAN ASSISTANT

## 2023-10-26 PROCEDURE — 3075F SYST BP GE 130 - 139MM HG: CPT | Performed by: PHYSICIAN ASSISTANT

## 2023-10-26 RX ORDER — ONDANSETRON 4 MG/1
4 TABLET, FILM COATED ORAL 3 TIMES DAILY PRN
Qty: 15 TABLET | Refills: 0 | Status: SHIPPED | OUTPATIENT
Start: 2023-10-26

## 2023-10-26 NOTE — PROGRESS NOTES
Chief Complaint   Dizziness and Nausea & Vomiting      History of Present Illness   Source of history provided by: patient. Steffany Brown is a 41 y.o. old female presenting to the walk in clinic for evaluation of intermittent dizziness, nausea, and 3 vomiting episodes which have been present for the past 3 days. Patient is currently taking semaglutide for weight loss. She reports that she increased her dose a day before these symptoms began. She denies any history of vertigo in the past.  Denies any visual changes. Pt denies any recent falls, syncope, CP, SOB, palpitations, HA, visual loss, unilateral weakness, fever, neck stiffness, or recent illness. ROS    Unless otherwise stated in this report or unable to obtain because of the patient's clinical or mental status as evidenced by the medical record, this patients's positive and negative responses for Review of Systems, constitutional, psych, eyes, ENT, cardiovascular, respiratory, gastrointestinal, neurological, genitourinary, musculoskeletal, integument systems and systems related to the presenting problem are either stated in the preceding or were not pertinent or were negative for the symptoms and/or complaints related to the medical problem. Past Medical History:  has a past medical history of Anxiety, Depression, Essential hypertension, Fractures, and High blood pressure. Past Surgical History:  has a past surgical history that includes Carpal tunnel release (Bilateral);  section; [place] tooth extraction; and Breast reduction surgery (). Social History:  reports that she quit smoking about 9 years ago. Her smoking use included cigarettes. She smoked an average of .5 packs per day. She has never used smokeless tobacco. She reports that she does not drink alcohol and does not use drugs. Family History: family history includes High Blood Pressure in her mother.    Allergies: Penicillins    Physical Exam         VS:  BP

## 2024-01-02 ENCOUNTER — OFFICE VISIT (OUTPATIENT)
Dept: FAMILY MEDICINE CLINIC | Age: 44
End: 2024-01-02
Payer: COMMERCIAL

## 2024-01-02 VITALS
TEMPERATURE: 97.8 F | HEIGHT: 62 IN | WEIGHT: 217 LBS | SYSTOLIC BLOOD PRESSURE: 120 MMHG | DIASTOLIC BLOOD PRESSURE: 80 MMHG | HEART RATE: 98 BPM | OXYGEN SATURATION: 99 % | BODY MASS INDEX: 39.93 KG/M2 | RESPIRATION RATE: 17 BRPM

## 2024-01-02 DIAGNOSIS — J06.9 URI WITH COUGH AND CONGESTION: ICD-10-CM

## 2024-01-02 DIAGNOSIS — J02.0 STREP PHARYNGITIS: Primary | ICD-10-CM

## 2024-01-02 LAB — S PYO AG THROAT QL: POSITIVE

## 2024-01-02 PROCEDURE — 87880 STREP A ASSAY W/OPTIC: CPT | Performed by: PHYSICIAN ASSISTANT

## 2024-01-02 PROCEDURE — 3079F DIAST BP 80-89 MM HG: CPT | Performed by: PHYSICIAN ASSISTANT

## 2024-01-02 PROCEDURE — 3074F SYST BP LT 130 MM HG: CPT | Performed by: PHYSICIAN ASSISTANT

## 2024-01-02 PROCEDURE — 99213 OFFICE O/P EST LOW 20 MIN: CPT | Performed by: PHYSICIAN ASSISTANT

## 2024-01-02 RX ORDER — DEXTROMETHORPHAN HYDROBROMIDE AND PROMETHAZINE HYDROCHLORIDE 15; 6.25 MG/5ML; MG/5ML
5 SYRUP ORAL EVERY 6 HOURS PRN
Qty: 120 ML | Refills: 0 | Status: SHIPPED | OUTPATIENT
Start: 2024-01-02

## 2024-01-02 RX ORDER — CEFDINIR 300 MG/1
300 CAPSULE ORAL 2 TIMES DAILY
Qty: 20 CAPSULE | Refills: 0 | Status: SHIPPED | OUTPATIENT
Start: 2024-01-02 | End: 2024-01-12

## 2024-01-02 NOTE — PROGRESS NOTES
Chief Complaint       Cough (Yellow mucous, at home covid test negative), Otalgia (bilateral), Pharyngitis (3 days ago), Fever (High of 102), and Nasal Congestion      History of Present Illness   Source of history provided by: patient.     Fozia Naidu is a 43 y.o. old female presenting to the walk in clinic for evaluation of sore throat x 3 days. Reports associated pain with swallowing, nasal congestion, bilateral ear pressure, productive cough with yellow sputum, and fever (high of 102).  Denies any loss of taste/smell, dyspnea, dysphagia, CP, SOB, cough, nausea, vomiting, rash, or lethargy. Denies any known strep exposures. Denies any contact with any individuals with known COVID-19 infection or under investigation for COVID-19 infection.     ROS    Unless otherwise stated in this report or unable to obtain because of the patient's clinical or mental status as evidenced by the medical record, this patients's positive and negative responses for Review of Systems, constitutional, psych, eyes, ENT, cardiovascular, respiratory, gastrointestinal, neurological, genitourinary, musculoskeletal, integument systems and systems related to the presenting problem are either stated in the preceding or were not pertinent or were negative for the symptoms and/or complaints related to the medical problem.    Past Medical History:  has a past medical history of Anxiety, Depression, Essential hypertension, Fractures, and High blood pressure.  Past Surgical History:  has a past surgical history that includes Carpal tunnel release (Bilateral);  section; [place] tooth extraction; and Breast reduction surgery ().  Social History:  reports that she quit smoking about 9 years ago. Her smoking use included cigarettes. She has never used smokeless tobacco. She reports that she does not drink alcohol and does not use drugs.  Family History: family history includes High Blood Pressure in her mother.   Allergies:

## 2024-01-23 DIAGNOSIS — I10 ESSENTIAL HYPERTENSION: ICD-10-CM

## 2024-01-23 DIAGNOSIS — F41.9 ANXIETY: ICD-10-CM

## 2024-01-24 RX ORDER — LOSARTAN POTASSIUM 50 MG/1
TABLET ORAL
Qty: 90 TABLET | Refills: 0 | Status: SHIPPED | OUTPATIENT
Start: 2024-01-24

## 2024-02-26 ENCOUNTER — TELEPHONE (OUTPATIENT)
Dept: FAMILY MEDICINE CLINIC | Age: 44
End: 2024-02-26

## 2024-04-11 ENCOUNTER — OFFICE VISIT (OUTPATIENT)
Dept: FAMILY MEDICINE CLINIC | Age: 44
End: 2024-04-11
Payer: COMMERCIAL

## 2024-04-11 VITALS
HEART RATE: 90 BPM | WEIGHT: 222.6 LBS | HEIGHT: 63 IN | BODY MASS INDEX: 39.44 KG/M2 | SYSTOLIC BLOOD PRESSURE: 120 MMHG | OXYGEN SATURATION: 98 % | TEMPERATURE: 98 F | DIASTOLIC BLOOD PRESSURE: 78 MMHG

## 2024-04-11 DIAGNOSIS — F32.1 CURRENT MODERATE EPISODE OF MAJOR DEPRESSIVE DISORDER WITHOUT PRIOR EPISODE (HCC): ICD-10-CM

## 2024-04-11 DIAGNOSIS — J01.90 ACUTE NON-RECURRENT SINUSITIS, UNSPECIFIED LOCATION: ICD-10-CM

## 2024-04-11 DIAGNOSIS — F41.9 ANXIETY: ICD-10-CM

## 2024-04-11 DIAGNOSIS — I10 ESSENTIAL HYPERTENSION: Primary | ICD-10-CM

## 2024-04-11 PROCEDURE — 3078F DIAST BP <80 MM HG: CPT | Performed by: FAMILY MEDICINE

## 2024-04-11 PROCEDURE — 99214 OFFICE O/P EST MOD 30 MIN: CPT | Performed by: FAMILY MEDICINE

## 2024-04-11 PROCEDURE — 3074F SYST BP LT 130 MM HG: CPT | Performed by: FAMILY MEDICINE

## 2024-04-11 RX ORDER — LOSARTAN POTASSIUM 50 MG/1
TABLET ORAL
Qty: 90 TABLET | Refills: 1 | Status: SHIPPED
Start: 2024-04-11 | End: 2024-04-11

## 2024-04-11 RX ORDER — CEFDINIR 300 MG/1
300 CAPSULE ORAL 2 TIMES DAILY
Qty: 20 CAPSULE | Refills: 0 | Status: SHIPPED | OUTPATIENT
Start: 2024-04-11 | End: 2024-04-21

## 2024-04-11 RX ORDER — PREDNISONE 20 MG/1
20 TABLET ORAL DAILY
Qty: 5 TABLET | Refills: 0 | Status: SHIPPED | OUTPATIENT
Start: 2024-04-11 | End: 2024-04-16

## 2024-04-11 RX ORDER — LOSARTAN POTASSIUM 50 MG/1
TABLET ORAL
Qty: 90 TABLET | Refills: 1 | Status: SHIPPED | OUTPATIENT
Start: 2024-04-11

## 2024-04-11 RX ORDER — BENZONATATE 200 MG/1
200 CAPSULE ORAL 3 TIMES DAILY PRN
Qty: 30 CAPSULE | Refills: 1 | Status: SHIPPED | OUTPATIENT
Start: 2024-04-11 | End: 2024-04-21

## 2024-04-11 ASSESSMENT — ENCOUNTER SYMPTOMS
SORE THROAT: 0
SINUS PAIN: 0
ABDOMINAL PAIN: 0
TROUBLE SWALLOWING: 0
SHORTNESS OF BREATH: 0
EYE PAIN: 0
CONSTIPATION: 0
BACK PAIN: 0
DIARRHEA: 0
SINUS PRESSURE: 1
RHINORRHEA: 1
VOMITING: 0
CHEST TIGHTNESS: 0
WHEEZING: 0
NAUSEA: 0
COUGH: 1

## 2024-04-11 ASSESSMENT — PATIENT HEALTH QUESTIONNAIRE - PHQ9
6. FEELING BAD ABOUT YOURSELF - OR THAT YOU ARE A FAILURE OR HAVE LET YOURSELF OR YOUR FAMILY DOWN: NOT AT ALL
1. LITTLE INTEREST OR PLEASURE IN DOING THINGS: NOT AT ALL
SUM OF ALL RESPONSES TO PHQ QUESTIONS 1-9: 0
2. FEELING DOWN, DEPRESSED OR HOPELESS: NOT AT ALL
10. IF YOU CHECKED OFF ANY PROBLEMS, HOW DIFFICULT HAVE THESE PROBLEMS MADE IT FOR YOU TO DO YOUR WORK, TAKE CARE OF THINGS AT HOME, OR GET ALONG WITH OTHER PEOPLE: NOT DIFFICULT AT ALL
SUM OF ALL RESPONSES TO PHQ9 QUESTIONS 1 & 2: 0
3. TROUBLE FALLING OR STAYING ASLEEP: NOT AT ALL
9. THOUGHTS THAT YOU WOULD BE BETTER OFF DEAD, OR OF HURTING YOURSELF: NOT AT ALL
4. FEELING TIRED OR HAVING LITTLE ENERGY: NOT AT ALL
SUM OF ALL RESPONSES TO PHQ QUESTIONS 1-9: 0
7. TROUBLE CONCENTRATING ON THINGS, SUCH AS READING THE NEWSPAPER OR WATCHING TELEVISION: NOT AT ALL
5. POOR APPETITE OR OVEREATING: NOT AT ALL
SUM OF ALL RESPONSES TO PHQ QUESTIONS 1-9: 0
8. MOVING OR SPEAKING SO SLOWLY THAT OTHER PEOPLE COULD HAVE NOTICED. OR THE OPPOSITE, BEING SO FIGETY OR RESTLESS THAT YOU HAVE BEEN MOVING AROUND A LOT MORE THAN USUAL: NOT AT ALL
SUM OF ALL RESPONSES TO PHQ QUESTIONS 1-9: 0

## 2024-04-11 NOTE — PROGRESS NOTES
24    Name: Fozia Naidu  :1980   Sex:female   Age:44 y.o.    Chief Complaint   Patient presents with    Hypertension    Anxiety    Congestion    Cough     Patient presents to office for visit. She has not done labs yet and she is not fasting this morning. Patient says her current dose of Zoloft is helpful, she is only taking 50 mg. Still doing semaglutide with weight loss program. Patient says she is on a higher dose right now and it doesn't seem to be as effective as the last dose. She says she isn't having mood swings and anxiety anymore before her periods. Patient started with cough and cold symptoms Saturday, no relief with allergy medications.     Here for check up  She has been doing well  Tillsa   Started with head ocngestion and last 2 days tereible cough, cannot lay flat at n ight  No shortness of breath no fevers  A lot of head pressures and ear pressure  Not feeling well at all    HTN  Readigns great  She is takign the losartan with no issues  Continue    Depression and anxeity  The sertraline is working great  She did decrease it back to 50mg and feeling really good with this dose  No further changes right now    Labs still need odne  Seh will go in the next week  She is seeing [facility] and getting semaglutide for weight management  She is down 20 pounds so far  Feeling pretty good, no side effects          Review of Systems   Constitutional:  Negative for appetite change, fatigue and fever.   HENT:  Positive for congestion, postnasal drip, rhinorrhea and sinus pressure. Negative for ear pain, sinus pain, sore throat and trouble swallowing.    Eyes:  Negative for pain.   Respiratory:  Positive for cough. Negative for chest tightness, shortness of breath and wheezing.    Cardiovascular:  Negative for chest pain, palpitations and leg swelling.   Gastrointestinal:  Negative for abdominal pain, constipation, diarrhea, nausea and vomiting.   Endocrine: Negative for cold

## 2024-08-04 ENCOUNTER — PATIENT MESSAGE (OUTPATIENT)
Dept: FAMILY MEDICINE CLINIC | Age: 44
End: 2024-08-04

## 2024-08-04 DIAGNOSIS — I10 ESSENTIAL HYPERTENSION: ICD-10-CM

## 2024-08-04 DIAGNOSIS — F41.9 ANXIETY: ICD-10-CM

## 2024-08-05 RX ORDER — LOSARTAN POTASSIUM 50 MG/1
TABLET ORAL
Qty: 90 TABLET | Refills: 0 | Status: SHIPPED | OUTPATIENT
Start: 2024-08-05

## 2024-11-04 DIAGNOSIS — F41.9 ANXIETY: ICD-10-CM

## 2024-11-04 DIAGNOSIS — I10 ESSENTIAL HYPERTENSION: ICD-10-CM

## 2024-11-04 RX ORDER — LOSARTAN POTASSIUM 50 MG/1
TABLET ORAL
Qty: 90 TABLET | Refills: 0 | OUTPATIENT
Start: 2024-11-04

## 2024-11-04 RX ORDER — LOSARTAN POTASSIUM 50 MG/1
TABLET ORAL
Qty: 90 TABLET | Refills: 0 | Status: SHIPPED | OUTPATIENT
Start: 2024-11-04

## 2024-11-04 NOTE — TELEPHONE ENCOUNTER
Last Appointment:  4/11/2024  Future Appointments   Date Time Provider Department Center   1/13/2025 10:00 AM Deja Arevalo DO [facility]

## 2025-01-13 ENCOUNTER — OFFICE VISIT (OUTPATIENT)
Dept: FAMILY MEDICINE CLINIC | Age: 45
End: 2025-01-13
Payer: COMMERCIAL

## 2025-01-13 VITALS
WEIGHT: 219.4 LBS | SYSTOLIC BLOOD PRESSURE: 114 MMHG | BODY MASS INDEX: 38.88 KG/M2 | HEART RATE: 92 BPM | DIASTOLIC BLOOD PRESSURE: 80 MMHG | OXYGEN SATURATION: 99 % | HEIGHT: 63 IN | TEMPERATURE: 98.2 F

## 2025-01-13 DIAGNOSIS — I10 ESSENTIAL HYPERTENSION: ICD-10-CM

## 2025-01-13 DIAGNOSIS — L65.8 TRACTION ALOPECIA: ICD-10-CM

## 2025-01-13 DIAGNOSIS — Z00.00 ENCOUNTER FOR WELL ADULT EXAM WITHOUT ABNORMAL FINDINGS: ICD-10-CM

## 2025-01-13 DIAGNOSIS — F32.1 CURRENT MODERATE EPISODE OF MAJOR DEPRESSIVE DISORDER WITHOUT PRIOR EPISODE (HCC): ICD-10-CM

## 2025-01-13 DIAGNOSIS — Z00.00 ENCOUNTER FOR WELL ADULT EXAM WITHOUT ABNORMAL FINDINGS: Primary | ICD-10-CM

## 2025-01-13 DIAGNOSIS — F41.9 ANXIETY: ICD-10-CM

## 2025-01-13 DIAGNOSIS — Z12.31 ENCOUNTER FOR SCREENING MAMMOGRAM FOR BREAST CANCER: ICD-10-CM

## 2025-01-13 LAB
ALBUMIN: 4.1 G/DL (ref 3.5–5.2)
ALP BLD-CCNC: 81 U/L (ref 35–104)
ALT SERPL-CCNC: 10 U/L (ref 0–32)
ANION GAP SERPL CALCULATED.3IONS-SCNC: 15 MMOL/L (ref 7–16)
AST SERPL-CCNC: 12 U/L (ref 0–31)
BASOPHILS ABSOLUTE: 0.07 K/UL (ref 0–0.2)
BASOPHILS RELATIVE PERCENT: 1 % (ref 0–2)
BILIRUB SERPL-MCNC: 0.6 MG/DL (ref 0–1.2)
BUN BLDV-MCNC: 17 MG/DL (ref 6–20)
CALCIUM SERPL-MCNC: 9.4 MG/DL (ref 8.6–10.2)
CHLORIDE BLD-SCNC: 104 MMOL/L (ref 98–107)
CHOLESTEROL, TOTAL: 178 MG/DL
CO2: 21 MMOL/L (ref 22–29)
CREAT SERPL-MCNC: 0.7 MG/DL (ref 0.5–1)
EOSINOPHILS ABSOLUTE: 0.14 K/UL (ref 0.05–0.5)
EOSINOPHILS RELATIVE PERCENT: 2 % (ref 0–6)
GFR, ESTIMATED: >90 ML/MIN/1.73M2
GLUCOSE BLD-MCNC: 85 MG/DL (ref 74–99)
HCT VFR BLD CALC: 42.8 % (ref 34–48)
HDLC SERPL-MCNC: 59 MG/DL
HEMOGLOBIN: 13.3 G/DL (ref 11.5–15.5)
IMMATURE GRANULOCYTES %: 0 % (ref 0–5)
IMMATURE GRANULOCYTES ABSOLUTE: <0.03 K/UL (ref 0–0.58)
LDL CHOLESTEROL: 103 MG/DL
LYMPHOCYTES ABSOLUTE: 2.66 K/UL (ref 1.5–4)
LYMPHOCYTES RELATIVE PERCENT: 35 % (ref 20–42)
MCH RBC QN AUTO: 29.3 PG (ref 26–35)
MCHC RBC AUTO-ENTMCNC: 31.1 G/DL (ref 32–34.5)
MCV RBC AUTO: 94.3 FL (ref 80–99.9)
MONOCYTES ABSOLUTE: 0.65 K/UL (ref 0.1–0.95)
MONOCYTES RELATIVE PERCENT: 9 % (ref 2–12)
NEUTROPHILS ABSOLUTE: 4.08 K/UL (ref 1.8–7.3)
NEUTROPHILS RELATIVE PERCENT: 54 % (ref 43–80)
PDW BLD-RTO: 14.2 % (ref 11.5–15)
PLATELET # BLD: 294 K/UL (ref 130–450)
PMV BLD AUTO: 9.9 FL (ref 7–12)
POTASSIUM SERPL-SCNC: 5 MMOL/L (ref 3.5–5)
RBC # BLD: 4.54 M/UL (ref 3.5–5.5)
SODIUM BLD-SCNC: 140 MMOL/L (ref 132–146)
TOTAL PROTEIN: 7.6 G/DL (ref 6.4–8.3)
TRIGL SERPL-MCNC: 80 MG/DL
VLDLC SERPL CALC-MCNC: 16 MG/DL
WBC # BLD: 7.6 K/UL (ref 4.5–11.5)

## 2025-01-13 PROCEDURE — 99396 PREV VISIT EST AGE 40-64: CPT | Performed by: FAMILY MEDICINE

## 2025-01-13 PROCEDURE — 3074F SYST BP LT 130 MM HG: CPT | Performed by: FAMILY MEDICINE

## 2025-01-13 PROCEDURE — 3079F DIAST BP 80-89 MM HG: CPT | Performed by: FAMILY MEDICINE

## 2025-01-13 RX ORDER — LOSARTAN POTASSIUM 50 MG/1
TABLET ORAL
Qty: 90 TABLET | Refills: 2 | Status: SHIPPED | OUTPATIENT
Start: 2025-01-13

## 2025-01-13 ASSESSMENT — ENCOUNTER SYMPTOMS
SINUS PAIN: 0
EYE PAIN: 0
CONSTIPATION: 0
CHEST TIGHTNESS: 0
BACK PAIN: 0
NAUSEA: 0
SHORTNESS OF BREATH: 0
DIARRHEA: 0
SORE THROAT: 0
COUGH: 0
WHEEZING: 0
ABDOMINAL PAIN: 0
TROUBLE SWALLOWING: 0
VOMITING: 0

## 2025-01-13 ASSESSMENT — PATIENT HEALTH QUESTIONNAIRE - PHQ9
2. FEELING DOWN, DEPRESSED OR HOPELESS: NOT AT ALL
9. THOUGHTS THAT YOU WOULD BE BETTER OFF DEAD, OR OF HURTING YOURSELF: NOT AT ALL
SUM OF ALL RESPONSES TO PHQ QUESTIONS 1-9: 0
5. POOR APPETITE OR OVEREATING: NOT AT ALL
SUM OF ALL RESPONSES TO PHQ QUESTIONS 1-9: 0
SUM OF ALL RESPONSES TO PHQ QUESTIONS 1-9: 0
8. MOVING OR SPEAKING SO SLOWLY THAT OTHER PEOPLE COULD HAVE NOTICED. OR THE OPPOSITE, BEING SO FIGETY OR RESTLESS THAT YOU HAVE BEEN MOVING AROUND A LOT MORE THAN USUAL: NOT AT ALL
7. TROUBLE CONCENTRATING ON THINGS, SUCH AS READING THE NEWSPAPER OR WATCHING TELEVISION: NOT AT ALL
SUM OF ALL RESPONSES TO PHQ9 QUESTIONS 1 & 2: 0
3. TROUBLE FALLING OR STAYING ASLEEP: NOT AT ALL
4. FEELING TIRED OR HAVING LITTLE ENERGY: NOT AT ALL
SUM OF ALL RESPONSES TO PHQ QUESTIONS 1-9: 0
1. LITTLE INTEREST OR PLEASURE IN DOING THINGS: NOT AT ALL
10. IF YOU CHECKED OFF ANY PROBLEMS, HOW DIFFICULT HAVE THESE PROBLEMS MADE IT FOR YOU TO DO YOUR WORK, TAKE CARE OF THINGS AT HOME, OR GET ALONG WITH OTHER PEOPLE: NOT DIFFICULT AT ALL
6. FEELING BAD ABOUT YOURSELF - OR THAT YOU ARE A FAILURE OR HAVE LET YOURSELF OR YOUR FAMILY DOWN: NOT AT ALL

## 2025-01-13 NOTE — PROGRESS NOTES
25    Name: Fozia Naidu  :1980   Sex:female   Age:44 y.o.    Chief Complaint   Patient presents with    Hypertension    Anxiety    Shoulder Pain     Patient presents to office for visit. She is fasting this morning if labs need done. Patient is still doing semaglutide through a weight loss program, she isn't doing as high of a dose due to cost. She does not check her blood pressure at home. Patient has been having joint pain in her right shoulder, patient says she did break that collarbone many years ago.     Here for check up  She has been doing well    Mammogram ordered  Labs ordered as well    HTN  Well controlled, the losartan is working great  No issues  Readings are really good, no changes    Depression and anxiety  The sertraline is still really effective  No changes  Sleeping well  Still some stress but it has been better over all    Still doing weight management and is on semaglutide  Weight has not really been going down very much   She is tarting to really try to watch diet as well            Review of Systems   Constitutional:  Negative for appetite change, fatigue and fever.   HENT:  Negative for congestion, ear pain, sinus pain, sore throat and trouble swallowing.    Eyes:  Negative for pain.   Respiratory:  Negative for cough, chest tightness, shortness of breath and wheezing.    Cardiovascular:  Negative for chest pain, palpitations and leg swelling.   Gastrointestinal:  Negative for abdominal pain, constipation, diarrhea, nausea and vomiting.   Endocrine: Negative for cold intolerance and heat intolerance.   Genitourinary:  Negative for difficulty urinating, hematuria and pelvic pain.   Musculoskeletal:  Positive for arthralgias. Negative for back pain, gait problem and joint swelling.   Skin:  Negative for rash and wound.   Neurological:  Negative for dizziness, syncope and headaches.   Hematological:  Negative for adenopathy.   Psychiatric/Behavioral:  Negative for confusion,

## 2025-04-09 ENCOUNTER — OFFICE VISIT (OUTPATIENT)
Dept: FAMILY MEDICINE CLINIC | Age: 45
End: 2025-04-09

## 2025-04-09 VITALS
RESPIRATION RATE: 18 BRPM | HEART RATE: 114 BPM | OXYGEN SATURATION: 97 % | SYSTOLIC BLOOD PRESSURE: 124 MMHG | WEIGHT: 227 LBS | BODY MASS INDEX: 40.22 KG/M2 | HEIGHT: 63 IN | DIASTOLIC BLOOD PRESSURE: 74 MMHG | TEMPERATURE: 97.4 F

## 2025-04-09 DIAGNOSIS — R05.9 COUGH, UNSPECIFIED TYPE: Primary | ICD-10-CM

## 2025-04-09 LAB
Lab: NORMAL
PERFORMING INSTRUMENT: NORMAL
QC PASS/FAIL: NORMAL
SARS-COV-2, POC: NORMAL

## 2025-04-09 RX ORDER — METHYLPREDNISOLONE 4 MG/1
TABLET ORAL
Qty: 1 KIT | Refills: 0 | Status: SHIPPED | OUTPATIENT
Start: 2025-04-09 | End: 2025-04-15

## 2025-04-09 RX ORDER — DOXYCYCLINE HYCLATE 100 MG
100 TABLET ORAL 2 TIMES DAILY
Qty: 20 TABLET | Refills: 0 | Status: SHIPPED | OUTPATIENT
Start: 2025-04-09 | End: 2025-04-19

## 2025-04-09 RX ORDER — GUAIFENESIN 600 MG/1
600 TABLET, EXTENDED RELEASE ORAL 2 TIMES DAILY
Qty: 30 TABLET | Refills: 0 | Status: SHIPPED | OUTPATIENT
Start: 2025-04-09 | End: 2025-04-24

## 2025-04-09 NOTE — PROGRESS NOTES
OFFICE NOTE    25  Name: Fozia Naidu  :1980   Sex:female   Age:45 y.o.      SUBJECTIVE  Chief Complaint   Patient presents with    Ear Pain     Bilateral     Sore Throat    Cough    Congestion    Shortness of Breath       History of Present Illness  The patient is a 45-year-old female who presents for evaluation of sinus infection.    She began experiencing symptoms indicative of a sinus infection on . Shee reports that her symptoms initially improved upon waking this morning but worsened with physical activity. She describes a sensation of her head feeling like a balloon, accompanied by drainage and sneezing. She also reports mild earache, scratchy throat, and cough. She has not monitored her temperature. She has been using Flonase as part of her treatment regimen.    Supplemental Information  She was on semaglutide for weight loss but is not doing that anymore.    SOCIAL HISTORY  She is an  at [employer].    ALLERGIES  The patient is allergic to PENICILLIN.    MEDICATIONS  Flonase, semaglutide (discontinued).       Review of Systems         Current Outpatient Medications:     Fluticasone Propionate (FLONASE NA), by Nasal route as needed, Disp: , Rfl:     doxycycline hyclate (VIBRA-TABS) 100 MG tablet, Take 1 tablet by mouth 2 times daily for 10 days, Disp: 20 tablet, Rfl: 0    guaiFENesin (MUCINEX) 600 MG extended release tablet, Take 1 tablet by mouth 2 times daily for 15 days, Disp: 30 tablet, Rfl: 0    methylPREDNISolone (MEDROL DOSEPACK) 4 MG tablet, Take by mouth., Disp: 1 kit, Rfl: 0    losartan (COZAAR) 50 MG tablet, TAKE 1 TABLET BY MOUTH EVERY DAY, Disp: 90 tablet, Rfl: 2    sertraline (ZOLOFT) 50 MG tablet, Take 1 tablet by mouth daily, Disp: 90 tablet, Rfl: 2    loratadine (CLARITIN) 10 MG tablet, Take 1 tablet by mouth as needed, Disp: , Rfl:     SEMAGLUTIDE-WEIGHT MANAGEMENT SC, Inject into the skin (Patient not taking: Reported on 2025), Disp: , Rfl:

## 2025-05-08 DIAGNOSIS — I10 ESSENTIAL HYPERTENSION: ICD-10-CM

## 2025-05-08 DIAGNOSIS — F41.9 ANXIETY: ICD-10-CM

## 2025-05-08 RX ORDER — LOSARTAN POTASSIUM 50 MG/1
50 TABLET ORAL DAILY
Qty: 90 TABLET | Refills: 1 | OUTPATIENT
Start: 2025-05-08

## 2025-05-21 ENCOUNTER — OFFICE VISIT (OUTPATIENT)
Dept: FAMILY MEDICINE CLINIC | Age: 45
End: 2025-05-21

## 2025-05-21 VITALS
BODY MASS INDEX: 40.22 KG/M2 | DIASTOLIC BLOOD PRESSURE: 90 MMHG | TEMPERATURE: 98.2 F | SYSTOLIC BLOOD PRESSURE: 136 MMHG | HEART RATE: 96 BPM | WEIGHT: 227 LBS | HEIGHT: 63 IN | RESPIRATION RATE: 20 BRPM | OXYGEN SATURATION: 96 %

## 2025-05-21 DIAGNOSIS — K08.89 TOOTH ACHE: Primary | ICD-10-CM

## 2025-05-21 DIAGNOSIS — K02.9 CARIOUS TEETH: ICD-10-CM

## 2025-05-21 RX ORDER — CLINDAMYCIN HYDROCHLORIDE 300 MG/1
300 CAPSULE ORAL 3 TIMES DAILY
Qty: 30 CAPSULE | Refills: 0 | Status: SHIPPED | OUTPATIENT
Start: 2025-05-21 | End: 2025-05-31

## 2025-05-21 ASSESSMENT — ENCOUNTER SYMPTOMS
SORE THROAT: 0
EYE PAIN: 0
VOMITING: 0
NAUSEA: 0
TROUBLE SWALLOWING: 0
SHORTNESS OF BREATH: 0
DIARRHEA: 0
SINUS PAIN: 0
BACK PAIN: 0
EYE REDNESS: 0
CHEST TIGHTNESS: 0
BLOOD IN STOOL: 0
EYE DISCHARGE: 0
COUGH: 0
PHOTOPHOBIA: 0
ABDOMINAL PAIN: 0
ALLERGIC/IMMUNOLOGIC NEGATIVE: 1

## 2025-05-21 NOTE — PROGRESS NOTES
25  Fozia Naidu : 1980 Sex: female  Age: 45 y.o.      Assessment and Plan:  Fozia was seen today for dental pain.    Diagnoses and all orders for this visit:    Tooth ache  -     clindamycin (CLEOCIN) 300 MG capsule; Take 1 capsule by mouth 3 times daily for 10 days    Carious teeth    Will will prescribe clindamycin for her toothache.  She can continue to alternate Tylenol and ibuprofen every 4 hours for pain relief.  She has a dental appointment set up for next week, should follow-up there.  If complaints worsen in any way she is to return to the office.    Return 3 to 5-day recheck if not improved.    Chief Complaint   Patient presents with    Dental Pain     Left side x 2 days       Pt presents with dental pain for the past 2 days.  The pain is located in the left upper molars.  Patient states pain is  a 5/10 on the pain scale. The patient has been taking ibuprofen and Tylenol at home with minimal relief in their discomfort.  Patient denies facial swelling or difficulty swallowing.  No fever or chills.  No nausea or vomiting.  No ear pain, cough and runny nose or nasal congestion.           Review of Systems   Constitutional:  Negative for appetite change, fatigue and unexpected weight change.   HENT:  Positive for dental problem. Negative for congestion, ear pain, hearing loss, sinus pain, sore throat and trouble swallowing.    Eyes:  Negative for photophobia, pain, discharge and redness.   Respiratory:  Negative for cough, chest tightness and shortness of breath.    Cardiovascular:  Negative for chest pain, palpitations and leg swelling.   Gastrointestinal:  Negative for abdominal pain, blood in stool, diarrhea, nausea and vomiting.   Endocrine: Negative.    Genitourinary:  Negative for dysuria, flank pain, frequency and hematuria.   Musculoskeletal:  Negative for arthralgias, back pain, joint swelling and myalgias.   Skin: Negative.    Allergic/Immunologic: Negative.    Neurological:

## 2025-07-21 ENCOUNTER — OFFICE VISIT (OUTPATIENT)
Dept: FAMILY MEDICINE CLINIC | Age: 45
End: 2025-07-21
Payer: COMMERCIAL

## 2025-07-21 VITALS
OXYGEN SATURATION: 100 % | BODY MASS INDEX: 39.94 KG/M2 | WEIGHT: 225.4 LBS | DIASTOLIC BLOOD PRESSURE: 82 MMHG | HEART RATE: 82 BPM | TEMPERATURE: 98.2 F | HEIGHT: 63 IN | SYSTOLIC BLOOD PRESSURE: 130 MMHG

## 2025-07-21 DIAGNOSIS — N95.1 PERIMENOPAUSAL: ICD-10-CM

## 2025-07-21 DIAGNOSIS — E66.812 CLASS 2 SEVERE OBESITY DUE TO EXCESS CALORIES WITH SERIOUS COMORBIDITY AND BODY MASS INDEX (BMI) OF 39.0 TO 39.9 IN ADULT (HCC): ICD-10-CM

## 2025-07-21 DIAGNOSIS — Z00.00 ENCOUNTER FOR WELL ADULT EXAM WITHOUT ABNORMAL FINDINGS: Primary | ICD-10-CM

## 2025-07-21 DIAGNOSIS — E66.01 CLASS 2 SEVERE OBESITY DUE TO EXCESS CALORIES WITH SERIOUS COMORBIDITY AND BODY MASS INDEX (BMI) OF 39.0 TO 39.9 IN ADULT (HCC): ICD-10-CM

## 2025-07-21 DIAGNOSIS — I10 ESSENTIAL HYPERTENSION: Primary | ICD-10-CM

## 2025-07-21 DIAGNOSIS — F41.9 ANXIETY: ICD-10-CM

## 2025-07-21 PROCEDURE — 3075F SYST BP GE 130 - 139MM HG: CPT | Performed by: FAMILY MEDICINE

## 2025-07-21 PROCEDURE — 99214 OFFICE O/P EST MOD 30 MIN: CPT | Performed by: FAMILY MEDICINE

## 2025-07-21 PROCEDURE — 3079F DIAST BP 80-89 MM HG: CPT | Performed by: FAMILY MEDICINE

## 2025-07-21 RX ORDER — LOSARTAN POTASSIUM 50 MG/1
TABLET ORAL
Qty: 90 TABLET | Refills: 2 | Status: SHIPPED | OUTPATIENT
Start: 2025-07-21

## 2025-07-21 ASSESSMENT — ENCOUNTER SYMPTOMS
TROUBLE SWALLOWING: 0
NAUSEA: 0
CHEST TIGHTNESS: 0
EYE PAIN: 0
DIARRHEA: 0
SHORTNESS OF BREATH: 0
BACK PAIN: 0
CONSTIPATION: 0
WHEEZING: 0
SINUS PAIN: 0
VOMITING: 0
COUGH: 0
ABDOMINAL PAIN: 0
SORE THROAT: 0

## 2025-07-21 NOTE — PROGRESS NOTES
25    Name: Fozia Naidu  :1980   Sex:female   Age:45 y.o.    Chief Complaint   Patient presents with    Hypertension    Anxiety    Menstrual Problem     Patient presents to office for visit. She stopped taking semaglutide because she was not committed to changing her diet. Patient says since being on semaglutide her menstrual cycle has become extremely heavy with very large clots. She did stop drinking pop. Patient is going to go back to [facility] and has questions about tirzepitide. Patient is taking losartan and zoloft.     Here for check up  At last visit she was on semag;utide and doing well ,lost some weight but was still eating and drinking a lot of sugar so she stopped it  No is more committed, noticed she felt better  She has stopped pop, drinking more water and trying to eat better  She sees different weight management group later this week to talk a bout the tirzepitide    Has noticed someheavy menses off and on  At times she will feel tired while on period butnot with every one  Likely perimenopause b/c some of her menses are light and normal  She is due for appt with her gyn though as she does have a history of HPV    Htn  Readings great  Blood pressures well controlled  Taking losartan and no issues    Labs in 6 months  Mammogram are up todate      Review of Systems   Constitutional:  Negative for appetite change, fatigue and fever.   HENT:  Negative for congestion, ear pain, sinus pain, sore throat and trouble swallowing.    Eyes:  Negative for pain.   Respiratory:  Negative for cough, chest tightness, shortness of breath and wheezing.    Cardiovascular:  Negative for chest pain, palpitations and leg swelling.   Gastrointestinal:  Negative for abdominal pain, constipation, diarrhea, nausea and vomiting.   Endocrine: Negative for cold intolerance and heat intolerance.   Genitourinary:  Positive for menstrual problem. Negative for difficulty urinating, hematuria and pelvic pain.